# Patient Record
Sex: MALE | Race: WHITE | NOT HISPANIC OR LATINO | Employment: OTHER
[De-identification: names, ages, dates, MRNs, and addresses within clinical notes are randomized per-mention and may not be internally consistent; named-entity substitution may affect disease eponyms.]

---

## 2017-01-30 PROBLEM — L97.522 ULCER OF TOE OF LEFT FOOT, WITH FAT LAYER EXPOSED (HCC): Status: ACTIVE | Noted: 2017-01-30

## 2017-03-13 PROBLEM — B35.1 DERMATOPHYTOSIS, NAIL: Status: ACTIVE | Noted: 2017-03-13

## 2017-06-28 ENCOUNTER — PRIOR ORIGINAL RECORDS (OUTPATIENT)
Dept: OTHER | Age: 72
End: 2017-06-28

## 2017-07-21 ENCOUNTER — PRIOR ORIGINAL RECORDS (OUTPATIENT)
Dept: OTHER | Age: 72
End: 2017-07-21

## 2017-08-09 LAB
ALBUMIN: 3.4 G/DL
ALKALINE PHOSPHATATE(ALK PHOS): 53 IU/L
BILIRUBIN TOTAL: 1.1 MG/DL
BUN: 15 MG/DL
CALCIUM: 9.3 MG/DL
CHLORIDE: 102 MEQ/L
CREATININE, SERUM: 1 MG/DL
GLUCOSE: 113 MG/DL
HEMATOCRIT: 42.8 %
HEMOGLOBIN A1C: 4.7 %
HEMOGLOBIN: 14.7 G/DL
PLATELETS: 142 K/UL
POTASSIUM, SERUM: 4.7 MEQ/L
PROTEIN, TOTAL: 6 G/DL
RED BLOOD COUNT: 4.41 X 10-6/U
SGOT (AST): 30 IU/L
SGPT (ALT): 34 IU/L
SODIUM: 136 MEQ/L
WHITE BLOOD COUNT: 5.3 X 10-3/U

## 2017-08-31 ENCOUNTER — PRIOR ORIGINAL RECORDS (OUTPATIENT)
Dept: OTHER | Age: 72
End: 2017-08-31

## 2017-09-06 ENCOUNTER — PRIOR ORIGINAL RECORDS (OUTPATIENT)
Dept: OTHER | Age: 72
End: 2017-09-06

## 2017-09-19 ENCOUNTER — PRIOR ORIGINAL RECORDS (OUTPATIENT)
Dept: OTHER | Age: 72
End: 2017-09-19

## 2017-09-21 ENCOUNTER — PRIOR ORIGINAL RECORDS (OUTPATIENT)
Dept: OTHER | Age: 72
End: 2017-09-21

## 2017-09-29 ENCOUNTER — PRIOR ORIGINAL RECORDS (OUTPATIENT)
Dept: OTHER | Age: 72
End: 2017-09-29

## 2017-10-17 ENCOUNTER — MYAURORA ACCOUNT LINK (OUTPATIENT)
Dept: OTHER | Age: 72
End: 2017-10-17

## 2017-10-17 ENCOUNTER — HOSPITAL ENCOUNTER (OUTPATIENT)
Dept: CV DIAGNOSTICS | Facility: HOSPITAL | Age: 72
Discharge: HOME | End: 2017-10-17
Attending: INTERNAL MEDICINE

## 2017-10-17 DIAGNOSIS — I48.91 ATRIAL FIBRILLATION, UNSPECIFIED TYPE (HCC): ICD-10-CM

## 2017-10-17 DIAGNOSIS — I25.10 CORONARY ARTERIOSCLEROSIS: ICD-10-CM

## 2018-01-19 ENCOUNTER — PRIOR ORIGINAL RECORDS (OUTPATIENT)
Dept: OTHER | Age: 73
End: 2018-01-19

## 2018-04-03 ENCOUNTER — PRIOR ORIGINAL RECORDS (OUTPATIENT)
Dept: OTHER | Age: 73
End: 2018-04-03

## 2018-04-03 PROCEDURE — 82746 ASSAY OF FOLIC ACID SERUM: CPT | Performed by: INTERNAL MEDICINE

## 2018-04-03 PROCEDURE — 82607 VITAMIN B-12: CPT | Performed by: INTERNAL MEDICINE

## 2018-07-27 ENCOUNTER — MYAURORA ACCOUNT LINK (OUTPATIENT)
Dept: OTHER | Age: 73
End: 2018-07-27

## 2018-07-27 ENCOUNTER — PRIOR ORIGINAL RECORDS (OUTPATIENT)
Dept: OTHER | Age: 73
End: 2018-07-27

## 2018-08-10 LAB
ALBUMIN: 3.7 G/DL
ALKALINE PHOSPHATATE(ALK PHOS): 52 IU/L
BILIRUBIN TOTAL: 0.9 MG/DL
BUN: 19 MG/DL
CALCIUM: 9.1 MG/DL
CHLORIDE: 108 MEQ/L
CREATININE, SERUM: 0.9 MG/DL
GLUCOSE: 102 MG/DL
HEMATOCRIT: 44.8 %
HEMOGLOBIN: 15.1 G/DL
PLATELETS: 149 K/UL
POTASSIUM, SERUM: 4.1 MEQ/L
PROTEIN, TOTAL: 6.4 G/DL
RED BLOOD COUNT: 4.63 X 10-6/U
SGOT (AST): 34 IU/L
SGPT (ALT): 44 IU/L
SODIUM: 142 MEQ/L
THYROID STIMULATING HORMONE: 2.3 MLU/L
WHITE BLOOD COUNT: 6.3 X 10-3/U

## 2018-08-12 PROBLEM — I10 ESSENTIAL HYPERTENSION: Status: ACTIVE | Noted: 2018-08-12

## 2018-08-12 PROBLEM — F41.9 ANXIETY: Status: ACTIVE | Noted: 2018-08-12

## 2018-08-12 PROBLEM — E78.5 HYPERLIPIDEMIA: Status: ACTIVE | Noted: 2018-08-12

## 2018-08-12 PROBLEM — I48.0 PAROXYSMAL ATRIAL FIBRILLATION (HCC): Status: ACTIVE | Noted: 2018-08-12

## 2019-02-12 ENCOUNTER — HOSPITAL ENCOUNTER (OUTPATIENT)
Dept: CV DIAGNOSTICS | Facility: HOSPITAL | Age: 74
Discharge: HOME | End: 2019-02-12
Attending: INTERNAL MEDICINE

## 2019-02-12 DIAGNOSIS — I48.91 ATRIAL FIBRILLATION, UNSPECIFIED TYPE (HCC): ICD-10-CM

## 2019-02-12 DIAGNOSIS — I25.10 CORONARY ARTERIOSCLEROSIS: ICD-10-CM

## 2019-02-18 PROBLEM — I34.0 NON-RHEUMATIC MITRAL REGURGITATION: Status: ACTIVE | Noted: 2019-02-18

## 2019-02-20 ENCOUNTER — PRIOR ORIGINAL RECORDS (OUTPATIENT)
Dept: OTHER | Age: 74
End: 2019-02-20

## 2019-02-21 ENCOUNTER — PRIOR ORIGINAL RECORDS (OUTPATIENT)
Dept: OTHER | Age: 74
End: 2019-02-21

## 2019-02-27 PROBLEM — I38 PULMONARY HYPERTENSION DUE TO LEFT HEART VALVULAR DISEASE (HCC): Status: ACTIVE | Noted: 2019-02-27

## 2019-02-27 PROBLEM — I27.22 PULMONARY HYPERTENSION DUE TO LEFT HEART VALVULAR DISEASE (HCC): Status: ACTIVE | Noted: 2019-02-27

## 2019-02-28 VITALS
HEIGHT: 70 IN | HEART RATE: 64 BPM | SYSTOLIC BLOOD PRESSURE: 132 MMHG | WEIGHT: 193 LBS | BODY MASS INDEX: 27.63 KG/M2 | DIASTOLIC BLOOD PRESSURE: 80 MMHG

## 2019-03-01 RX ORDER — METOPROLOL SUCCINATE 100 MG/1
100 TABLET, EXTENDED RELEASE ORAL DAILY
COMMUNITY
Start: 2018-07-27

## 2019-03-01 RX ORDER — BUSPIRONE HYDROCHLORIDE 15 MG/1
15 TABLET ORAL 2 TIMES DAILY
COMMUNITY
Start: 2017-04-10

## 2019-03-01 RX ORDER — DIAZEPAM 2 MG/1
2 TABLET ORAL DAILY
COMMUNITY
Start: 2018-07-27

## 2019-03-01 RX ORDER — AMITRIPTYLINE HYDROCHLORIDE 10 MG/1
30 TABLET, FILM COATED ORAL DAILY
COMMUNITY
Start: 2018-07-27

## 2019-03-01 RX ORDER — LISINOPRIL 40 MG/1
40 TABLET ORAL DAILY
COMMUNITY
Start: 2018-07-27

## 2019-03-01 RX ORDER — PRASUGREL 10 MG/1
10 TABLET, FILM COATED ORAL EVERY EVENING
COMMUNITY
Start: 2017-07-21

## 2019-03-05 ENCOUNTER — OFFICE VISIT (OUTPATIENT)
Dept: CARDIOLOGY | Age: 74
End: 2019-03-05

## 2019-03-05 VITALS
HEART RATE: 68 BPM | SYSTOLIC BLOOD PRESSURE: 112 MMHG | WEIGHT: 186 LBS | DIASTOLIC BLOOD PRESSURE: 68 MMHG | BODY MASS INDEX: 26.63 KG/M2 | RESPIRATION RATE: 20 BRPM | HEIGHT: 70 IN

## 2019-03-05 DIAGNOSIS — I48.0 PAROXYSMAL ATRIAL FIBRILLATION (CMD): ICD-10-CM

## 2019-03-05 DIAGNOSIS — I27.22 PULMONARY HYPERTENSION DUE TO LEFT HEART DISEASE (CMD): Primary | ICD-10-CM

## 2019-03-05 DIAGNOSIS — I34.0 MODERATE MITRAL REGURGITATION: ICD-10-CM

## 2019-03-05 PROCEDURE — 99205 OFFICE O/P NEW HI 60 MIN: CPT | Performed by: INTERNAL MEDICINE

## 2019-03-05 RX ORDER — FUROSEMIDE 20 MG/1
60 TABLET ORAL EVERY OTHER DAY
COMMUNITY
Start: 2020-03-05

## 2019-03-05 SDOH — HEALTH STABILITY: MENTAL HEALTH: HOW OFTEN DO YOU HAVE A DRINK CONTAINING ALCOHOL?: NEVER

## 2019-03-06 ENCOUNTER — TELEPHONE (OUTPATIENT)
Dept: CARDIOLOGY | Age: 74
End: 2019-03-06

## 2019-03-06 PROBLEM — I27.22 PULMONARY HYPERTENSION DUE TO LEFT HEART DISEASE (CMD): Status: ACTIVE | Noted: 2019-03-06

## 2019-03-06 PROBLEM — I48.0 PAROXYSMAL ATRIAL FIBRILLATION (CMD): Status: ACTIVE | Noted: 2019-03-06

## 2019-03-06 ASSESSMENT — NEW YORK HEART ASSOCIATION (NYHA) CLASSIFICATION: NYHA FUNCTIONAL CLASS: III

## 2019-03-26 ENCOUNTER — HOSPITAL ENCOUNTER (OUTPATIENT)
Dept: GENERAL RADIOLOGY | Facility: HOSPITAL | Age: 74
Discharge: HOME | End: 2019-03-26
Attending: INTERNAL MEDICINE
Payer: MEDICARE

## 2019-03-26 DIAGNOSIS — R13.19 ESOPHAGEAL DYSPHAGIA: ICD-10-CM

## 2019-03-26 PROCEDURE — 74220 X-RAY XM ESOPHAGUS 1CNTRST: CPT | Performed by: INTERNAL MEDICINE

## 2019-04-03 NOTE — PROGRESS NOTES
4/3/2019  Flaquita Gonzalezodell    Dear Maria De Jesus Rico,     Here are the results from your recent testing :    Imaging Orders  Esophagram    Results and Recommendations     As conveyed by Dr. Hong Gibbs, there is a

## 2019-04-11 PROBLEM — I50.32 CHRONIC HEART FAILURE WITH PRESERVED EJECTION FRACTION (HCC): Status: ACTIVE | Noted: 2019-04-11

## 2019-04-30 ENCOUNTER — E-ADVICE (OUTPATIENT)
Dept: CARDIOLOGY | Age: 74
End: 2019-04-30

## 2019-05-08 ENCOUNTER — E-ADVICE (OUTPATIENT)
Dept: CARDIOLOGY | Age: 74
End: 2019-05-08

## 2019-05-28 ENCOUNTER — E-ADVICE (OUTPATIENT)
Dept: CARDIOLOGY | Age: 74
End: 2019-05-28

## 2019-05-30 ENCOUNTER — E-ADVICE (OUTPATIENT)
Dept: CARDIOLOGY | Age: 74
End: 2019-05-30

## 2019-06-11 ENCOUNTER — E-ADVICE (OUTPATIENT)
Dept: CARDIOLOGY | Age: 74
End: 2019-06-11

## 2019-07-09 ENCOUNTER — E-ADVICE (OUTPATIENT)
Dept: CARDIOLOGY | Age: 74
End: 2019-07-09

## 2019-07-10 ENCOUNTER — ANESTHESIA EVENT (OUTPATIENT)
Dept: ENDOSCOPY | Facility: HOSPITAL | Age: 74
End: 2019-07-10

## 2019-07-11 ENCOUNTER — HOSPITAL ENCOUNTER (OUTPATIENT)
Facility: HOSPITAL | Age: 74
Setting detail: HOSPITAL OUTPATIENT SURGERY
Discharge: HOME | End: 2019-07-11
Attending: INTERNAL MEDICINE | Admitting: INTERNAL MEDICINE
Payer: MEDICARE

## 2019-07-11 ENCOUNTER — HOSPITAL ENCOUNTER (OUTPATIENT)
Dept: CV DIAGNOSTICS | Facility: HOSPITAL | Age: 74
Discharge: HOME | End: 2019-07-11
Attending: INTERNAL MEDICINE
Payer: MEDICARE

## 2019-07-11 ENCOUNTER — ANESTHESIA (OUTPATIENT)
Dept: ENDOSCOPY | Facility: HOSPITAL | Age: 74
End: 2019-07-11

## 2019-07-11 VITALS
HEIGHT: 70 IN | OXYGEN SATURATION: 100 % | SYSTOLIC BLOOD PRESSURE: 86 MMHG | BODY MASS INDEX: 28.63 KG/M2 | DIASTOLIC BLOOD PRESSURE: 54 MMHG | WEIGHT: 200 LBS | HEART RATE: 66 BPM | RESPIRATION RATE: 16 BRPM | TEMPERATURE: 98 F

## 2019-07-11 DIAGNOSIS — K22.5 ZENKER'S DIVERTICULUM: ICD-10-CM

## 2019-07-11 DIAGNOSIS — R93.1 ABNORMAL ECHOCARDIOGRAM: ICD-10-CM

## 2019-07-11 DIAGNOSIS — I34.0 NON-RHEUMATIC MITRAL REGURGITATION: ICD-10-CM

## 2019-07-11 PROCEDURE — 93325 DOPPLER ECHO COLOR FLOW MAPG: CPT | Performed by: INTERNAL MEDICINE

## 2019-07-11 PROCEDURE — 93312 ECHO TRANSESOPHAGEAL: CPT

## 2019-07-11 PROCEDURE — 93320 DOPPLER ECHO COMPLETE: CPT | Performed by: INTERNAL MEDICINE

## 2019-07-11 PROCEDURE — 0DJ08ZZ INSPECTION OF UPPER INTESTINAL TRACT, VIA NATURAL OR ARTIFICIAL OPENING ENDOSCOPIC: ICD-10-PCS | Performed by: INTERNAL MEDICINE

## 2019-07-11 RX ORDER — SODIUM CHLORIDE, SODIUM LACTATE, POTASSIUM CHLORIDE, CALCIUM CHLORIDE 600; 310; 30; 20 MG/100ML; MG/100ML; MG/100ML; MG/100ML
INJECTION, SOLUTION INTRAVENOUS CONTINUOUS
Status: DISCONTINUED | OUTPATIENT
Start: 2019-07-11 | End: 2019-07-11

## 2019-07-11 NOTE — PROCEDURES
[facility] [address] [place] [address] Cardiology  Transesophageal Echocardiogram Report    PREOPERATIVE DIAGNOSIS: Mitral regurgitation    POSTOPERATIVE DIAGNOSIS: Mitral regurgitation    PROCEDURE PERFORMED: Transesophageal echocardiogram with Doppler (CPT code 43853) and thickness and systolic function. The visualized portions of the tricuspid valve and pulmonic valve have normal morphology and motion. There is evidence of severe prolapse of the P2 scallop of the mitral valve.  The aortic valve morphology appears trileaflet

## 2019-07-11 NOTE — H&P
HPI:  Cassidy Underwood is a 68year old male. 5/4/1945.   Patient presents with:  Checkup: discuss EGD   Difficulty Swallowing           Thank you for sending your patient to see me for evaluation of possible need for RAFFAELE.     Notes dysphagia x 10 yrs     P MOUTH EVERY DAY AS NEEDED Disp: 30 tablet Rfl: 0   AMITRIPTYLINE HCL 10 MG Oral Tab TAKE 3 TABLETS BY MOUTH NIGHTLY Disp: 90 tablet Rfl: 0   BusPIRone HCl 15 MG Oral Tab TAKE TWO TABLETS BY MOUTH TWICE DAILY Disp: 180 tablet Rfl: 3   ROSUVASTATIN CALCIUM 1 in thirst or heat/cold intolerance  ALL/ASTHMA: denies hx of allergy or asthma        PHYSICAL EXAM:   /52   Ht 5' 9\" (1.753 m)   Wt 183 lb (83 kg)   BMI 27.02 kg/m²    GENERAL: well developed, well nourished, in no apparent distress  SKIN: no rashe image quality.     ADVERSE REACTION: None.     FINDINGS:      CHEST:   Coronary artery calcifications are present. No abnormally enlarged lymph nodes are appreciated in  the mediastinum or stella bilaterally.  There are some small calcified lymph nodes noted Bosniak  2F lesion. A follow-up CT or ultrasound in one year is recommended to document stability. 3. Likely bilateral adrenal gland adenomas.     Procedure Data:     Assessment and Plan   1. Dysphagia  Denies heartburn, regurgitation.    Denies to foods

## 2019-07-11 NOTE — ANESTHESIA PREPROCEDURE EVALUATION
PRE-OP EVALUATION    Patient Name: Mercy Hoang    Pre-op Diagnosis: Abnormal echocardiogram [R93.1]  Non-rheumatic mitral regurgitation [I34.0]  Zenker's diverticulum [K22.5]    Procedure(s):  SCHEDULED FOR TRANSESOPHAGEAL ECHOCARDIOGRAPHY (RAFFAELE) WITH Social History    Tobacco Use      Smoking status: Former Smoker      Smokeless tobacco: Never Used      Tobacco comment: QUIT 1982    Alcohol use: No      Comment: QUIT 1968      Drug use: No     Available pre-op labs reviewed.   Lab Results   Componen

## 2019-07-11 NOTE — PROGRESS NOTES
Per Dr. Denisha Winston the anesthesiologist the patient's BP runs low as normal VS's. He denies any pain or dizziness.  His belongings have been brought back in secure bag by a  and he acknowledges all of his belongings intact with witnessed signatur

## 2019-07-11 NOTE — OPERATIVE REPORT
[facility] OPERATIVE REPORT   PATIENT NAME: Wolf Almonte  MRN: [id number]  DATE OF OPERATION: 7/11/2019  PREOPERATIVE DIAGNOSIS:   Dysphagia  Zenker's diverticulum per esophagram    POSTOPERATIVE DIAGNOSES:    Normal duodenum

## 2019-07-11 NOTE — H&P
The patient was seen and examined. There was no change in the patient's clinical status from the date of the last progress note, to the date of their procedure today at [facility]. Thus, the progress note is up-to-date.     The patient is here for initial [facility] with Dr. Jensen today. PRIMARY CARE MD: Dr. Valentino Hager:  Current Outpatient Medications   Medication Sig Dispense Refill   • busPIRone 15 mg tablet Take 15 mg by mouth 2 (two) times daily. weakness and cane/walker  Neurologic: numbness or tingling of hands and numbness or tingling of feet  Psychiatric: no psychological history  Hematologic/Lymphatic/Immunologic: hx Steven's  Endocrine:no endocrine history    Fifi JOHNSONN RN  I have re volume was markedly increased. 7. Right ventricle: Systolic pressure was moderately increased. RV systolic     pressure (S, est): 62mm Hg. 8. Right atrium: The atrium was mildly dilated. 9. Tricuspid valve: There was mild-moderate regurgitation.     Impr estimated to     be 35mm Hg. Impressions:  This study is compared with previous dated 09/24/2014:  Compared to the previous study, LV size has increased. Atrial dimensions  have increased. PA systolic pressure has increased.     TRANSTHORACIC ECHOCARDIOGRA therapy    Denice Estrada MD  [facility] Fellow    Staff addendum: Patient seen and examined with Dr. Augie Spencer, and I have edited his note above to reflect my observations, amplified below.     Sherry Robertson is a 69 y.o. male with a history

## 2019-07-11 NOTE — DISCHARGE SUMMARY
Outpatient Surgery Brief Discharge Summary         Patient ID:  Flaquita Velazquez  [id number]  69year old  5/4/1945    Discharge Diagnoses:   Mild gastritis  Procedures:   EGD  Discharged Condition: stable    Disposition: home    Patient Instructions:  Violetta Flor

## 2019-07-12 ENCOUNTER — E-ADVICE (OUTPATIENT)
Dept: CARDIOLOGY | Age: 74
End: 2019-07-12

## 2019-07-17 NOTE — ANESTHESIA POSTPROCEDURE EVALUATION
[address] Patient Status:  Hospital Outpatient Surgery   Age/Gender 69year old male MRN [id number]   Location [facility]. Attending No att. providers found   Hosp Day # 0 PCP Erma Lanza MD       Anesthesia Post-op

## 2019-08-02 ENCOUNTER — OFFICE VISIT (OUTPATIENT)
Dept: CARDIOLOGY | Age: 74
End: 2019-08-02

## 2019-08-02 VITALS
HEART RATE: 60 BPM | SYSTOLIC BLOOD PRESSURE: 110 MMHG | HEIGHT: 70 IN | DIASTOLIC BLOOD PRESSURE: 60 MMHG | BODY MASS INDEX: 26.63 KG/M2 | WEIGHT: 186 LBS

## 2019-08-02 DIAGNOSIS — I27.20 PULMONARY HYPERTENSION (CMD): ICD-10-CM

## 2019-08-02 DIAGNOSIS — I34.0 NON-RHEUMATIC MITRAL REGURGITATION: ICD-10-CM

## 2019-08-02 DIAGNOSIS — I48.0 PAF (PAROXYSMAL ATRIAL FIBRILLATION) (CMD): Primary | ICD-10-CM

## 2019-08-02 PROCEDURE — 99215 OFFICE O/P EST HI 40 MIN: CPT | Performed by: INTERNAL MEDICINE

## 2019-08-02 ASSESSMENT — ENCOUNTER SYMPTOMS
BRUISES/BLEEDS EASILY: 0
ALLERGIC/IMMUNOLOGIC COMMENTS: NO NEW FOOD ALLERGIES
WEIGHT LOSS: 0
WEIGHT GAIN: 0
SUSPICIOUS LESIONS: 0
CHILLS: 0
HEMATOCHEZIA: 0
HEMOPTYSIS: 0
FEVER: 0
COUGH: 0

## 2019-09-18 ENCOUNTER — HOSPITAL (OUTPATIENT)
Dept: OTHER | Age: 74
End: 2019-09-18

## 2019-09-18 LAB
ANALYZER ANC (IANC): ABNORMAL
ANION GAP SERPL CALC-SCNC: 8 MMOL/L (ref 10–20)
BUN SERPL-MCNC: 22 MG/DL (ref 6–20)
BUN/CREAT SERPL: 20 (ref 7–25)
CALCIUM SERPL-MCNC: 9.4 MG/DL (ref 8.4–10.2)
CHLORIDE SERPL-SCNC: 108 MMOL/L (ref 98–107)
CO2 SERPL-SCNC: 28 MMOL/L (ref 21–32)
CREAT SERPL-MCNC: 1.12 MG/DL (ref 0.67–1.17)
ERYTHROCYTE [DISTWIDTH] IN BLOOD: 11.9 % (ref 11–15)
GLUCOSE SERPL-MCNC: 99 MG/DL (ref 65–99)
HCT VFR BLD CALC: 42.3 % (ref 39–51)
HGB BLD-MCNC: 14 G/DL (ref 13–17)
MCH RBC QN AUTO: 32.9 PG (ref 26–34)
MCHC RBC AUTO-ENTMCNC: 33.1 G/DL (ref 32–36.5)
MCV RBC AUTO: 99.3 FL (ref 78–100)
NRBC (NRBCRE): 0 /100 WBC
PLATELET # BLD: 143 K/MCL (ref 140–450)
POTASSIUM SERPL-SCNC: 4.5 MMOL/L (ref 3.4–5.1)
RBC # BLD: 4.26 MIL/MCL (ref 4.5–5.9)
SODIUM SERPL-SCNC: 140 MMOL/L (ref 135–145)
WBC # BLD: 7.5 K/MCL (ref 4.2–11)

## 2019-09-26 ENCOUNTER — E-ADVICE (OUTPATIENT)
Dept: CARDIOLOGY | Age: 74
End: 2019-09-26

## 2019-09-27 ENCOUNTER — HOSPITAL (OUTPATIENT)
Dept: OTHER | Age: 74
End: 2019-09-27
Attending: INTERNAL MEDICINE

## 2020-02-25 ENCOUNTER — TELEPHONE (OUTPATIENT)
Dept: CARDIOLOGY | Age: 75
End: 2020-02-25

## 2020-02-27 ENCOUNTER — OFFICE VISIT (OUTPATIENT)
Dept: CARDIOLOGY | Age: 75
End: 2020-02-27
Attending: INTERNAL MEDICINE

## 2020-02-27 ENCOUNTER — HOSPITAL ENCOUNTER (OUTPATIENT)
Dept: GENERAL RADIOLOGY | Age: 75
Discharge: HOME | End: 2020-02-27
Attending: NURSE PRACTITIONER

## 2020-02-27 VITALS
DIASTOLIC BLOOD PRESSURE: 68 MMHG | WEIGHT: 202.16 LBS | BODY MASS INDEX: 29.94 KG/M2 | RESPIRATION RATE: 18 BRPM | SYSTOLIC BLOOD PRESSURE: 114 MMHG | HEIGHT: 69 IN | OXYGEN SATURATION: 95 % | HEART RATE: 63 BPM

## 2020-02-27 DIAGNOSIS — I27.20 PULMONARY HYPERTENSION (CMD): ICD-10-CM

## 2020-02-27 DIAGNOSIS — I48.21 PERMANENT ATRIAL FIBRILLATION (CMD): ICD-10-CM

## 2020-02-27 DIAGNOSIS — I34.0 NONRHEUMATIC MITRAL VALVE REGURGITATION: Primary | ICD-10-CM

## 2020-02-27 DIAGNOSIS — I25.10 CORONARY ARTERY DISEASE INVOLVING NATIVE CORONARY ARTERY OF NATIVE HEART WITHOUT ANGINA PECTORIS: ICD-10-CM

## 2020-02-27 DIAGNOSIS — I50.32 CHRONIC HEART FAILURE WITH PRESERVED EJECTION FRACTION (HFPEF) (CMD): ICD-10-CM

## 2020-02-27 DIAGNOSIS — R09.89 OTH SYMPTOMS AND SIGNS INVOLVING THE CIRC AND RESP SYSTEMS: ICD-10-CM

## 2020-02-27 DIAGNOSIS — I10 ESSENTIAL HYPERTENSION: ICD-10-CM

## 2020-02-27 LAB
ALBUMIN SERPL-MCNC: 3.9 G/DL (ref 3.6–5.1)
ALBUMIN/GLOB SERPL: 1.2 {RATIO} (ref 1–2.4)
ALP SERPL-CCNC: 59 UNITS/L (ref 45–117)
ALT SERPL-CCNC: 48 UNITS/L
ANION GAP SERPL CALC-SCNC: 12 MMOL/L (ref 10–20)
AST SERPL-CCNC: 29 UNITS/L
BASOPHILS # BLD: 0 K/MCL (ref 0–0.3)
BASOPHILS NFR BLD: 0 %
BILIRUB SERPL-MCNC: 0.6 MG/DL (ref 0.2–1)
BUN SERPL-MCNC: 28 MG/DL (ref 6–20)
BUN/CREAT SERPL: 23 (ref 7–25)
CALCIUM SERPL-MCNC: 9.3 MG/DL (ref 8.4–10.2)
CHLORIDE SERPL-SCNC: 110 MMOL/L (ref 98–107)
CO2 SERPL-SCNC: 23 MMOL/L (ref 21–32)
CREAT SERPL-MCNC: 1.24 MG/DL (ref 0.67–1.17)
DIFFERENTIAL METHOD BLD: ABNORMAL
EOSINOPHIL # BLD: 0.1 K/MCL (ref 0.1–0.5)
EOSINOPHIL NFR BLD: 1 %
ERYTHROCYTE [DISTWIDTH] IN BLOOD: 12.1 % (ref 11–15)
GLOBULIN SER-MCNC: 3.3 G/DL (ref 2–4)
GLUCOSE SERPL-MCNC: 101 MG/DL (ref 65–99)
HCT VFR BLD CALC: 40.8 % (ref 39–51)
HGB BLD-MCNC: 13.9 G/DL (ref 13–17)
IMM GRANULOCYTES # BLD AUTO: 0.1 K/MCL (ref 0–0.2)
IMM GRANULOCYTES NFR BLD: 1 %
LYMPHOCYTES # BLD: 1.7 K/MCL (ref 1–4)
LYMPHOCYTES NFR BLD: 19 %
MCH RBC QN AUTO: 32.6 PG (ref 26–34)
MCHC RBC AUTO-ENTMCNC: 34.1 G/DL (ref 32–36.5)
MCV RBC AUTO: 95.6 FL (ref 78–100)
MONOCYTES # BLD: 0.9 K/MCL (ref 0.3–0.9)
MONOCYTES NFR BLD: 10 %
NEUTROPHILS # BLD: 6.4 K/MCL (ref 1.8–7.7)
NEUTROPHILS NFR BLD: 69 %
NRBC BLD MANUAL-RTO: 0 /100 WBC
NT-PROBNP SERPL-MCNC: 731 PG/ML
PLATELET # BLD: 147 K/MCL (ref 140–450)
POTASSIUM SERPL-SCNC: 3.9 MMOL/L (ref 3.4–5.1)
PROT SERPL-MCNC: 7.2 G/DL (ref 6.4–8.2)
RBC # BLD: 4.27 MIL/MCL (ref 4.5–5.9)
SODIUM SERPL-SCNC: 141 MMOL/L (ref 135–145)
WBC # BLD: 9.3 K/MCL (ref 4.2–11)

## 2020-02-27 PROCEDURE — 93005 ELECTROCARDIOGRAM TRACING: CPT

## 2020-02-27 PROCEDURE — 86920 COMPATIBILITY TEST SPIN: CPT

## 2020-02-27 PROCEDURE — 36415 COLL VENOUS BLD VENIPUNCTURE: CPT

## 2020-02-27 PROCEDURE — 83880 ASSAY OF NATRIURETIC PEPTIDE: CPT

## 2020-02-27 PROCEDURE — 86900 BLOOD TYPING SEROLOGIC ABO: CPT

## 2020-02-27 PROCEDURE — 80053 COMPREHEN METABOLIC PANEL: CPT

## 2020-02-27 PROCEDURE — 71046 X-RAY EXAM CHEST 2 VIEWS: CPT

## 2020-02-27 PROCEDURE — 85025 COMPLETE CBC W/AUTO DIFF WBC: CPT

## 2020-02-27 RX ORDER — TESTOSTERONE GEL, 1% 10 MG/G
1 GEL TRANSDERMAL DAILY
Refills: 3 | COMMUNITY
Start: 2020-02-11

## 2020-02-27 RX ORDER — EPLERENONE 25 MG/1
25 TABLET, FILM COATED ORAL DAILY
COMMUNITY
Start: 2020-02-24 | End: 2020-02-27

## 2020-02-27 RX ORDER — MICONAZOLE NITRATE, ZINC OXIDE, WHITE PETROLATUM 2.5; 150; 813.5 MG/G; MG/G; MG/G
1 OINTMENT TOPICAL DAILY
COMMUNITY
Start: 2020-01-22

## 2020-02-27 RX ORDER — ROSUVASTATIN CALCIUM 10 MG/1
10 TABLET, COATED ORAL DAILY
COMMUNITY
Start: 2019-12-16

## 2020-02-29 LAB
ATRIAL RATE (BPM): 89
P AXIS (DEGREES): 61
PR-INTERVAL (MSEC): 140
QRS-INTERVAL (MSEC): 96
QT-INTERVAL (MSEC): 370
QTC: 450
R AXIS (DEGREES): -40
REPORT TEXT: NORMAL
T AXIS (DEGREES): 74
VENTRICULAR RATE EKG/MIN (BPM): 89

## 2020-03-02 SDOH — HEALTH STABILITY: MENTAL HEALTH: HOW OFTEN DO YOU HAVE A DRINK CONTAINING ALCOHOL?: NEVER

## 2020-03-03 ENCOUNTER — ANESTHESIA EVENT (OUTPATIENT)
Dept: CARDIOLOGY | Age: 75
DRG: 267 | End: 2020-03-03

## 2020-03-03 PROBLEM — Z95.818 S/P MITRAL VALVE CLIP IMPLANTATION: Status: ACTIVE | Noted: 2020-03-03

## 2020-03-03 PROBLEM — Z98.890 S/P MITRAL VALVE CLIP IMPLANTATION: Status: ACTIVE | Noted: 2020-03-03

## 2020-03-04 ENCOUNTER — HOSPITAL ENCOUNTER (OUTPATIENT)
Dept: CARDIOLOGY | Age: 75
Discharge: HOME | DRG: 267 | End: 2020-03-04
Attending: INTERNAL MEDICINE

## 2020-03-04 ENCOUNTER — APPOINTMENT (OUTPATIENT)
Dept: CARDIOLOGY | Age: 75
DRG: 267 | End: 2020-03-04
Attending: NURSE PRACTITIONER

## 2020-03-04 ENCOUNTER — ANESTHESIA (OUTPATIENT)
Dept: CARDIOLOGY | Age: 75
DRG: 267 | End: 2020-03-04

## 2020-03-04 ENCOUNTER — HOSPITAL ENCOUNTER (INPATIENT)
Age: 75
LOS: 1 days | Discharge: HOME | DRG: 267 | End: 2020-03-05
Attending: INTERNAL MEDICINE | Admitting: INTERNAL MEDICINE

## 2020-03-04 DIAGNOSIS — I34.0 SEVERE MITRAL REGURGITATION: ICD-10-CM

## 2020-03-04 DIAGNOSIS — I10 ESSENTIAL HYPERTENSION: ICD-10-CM

## 2020-03-04 DIAGNOSIS — I50.32 CHRONIC HEART FAILURE WITH PRESERVED EJECTION FRACTION (CMD): ICD-10-CM

## 2020-03-04 DIAGNOSIS — F95.2 TOURETTE SYNDROME: ICD-10-CM

## 2020-03-04 DIAGNOSIS — Z00.6 EXAMINATION FOR NORMAL COMPARISON OR CONTROL IN CLINICAL RESEARCH: ICD-10-CM

## 2020-03-04 DIAGNOSIS — G47.9 ORGANIC SLEEP DISORDER: ICD-10-CM

## 2020-03-04 DIAGNOSIS — E29.1 HYPOGONADISM MALE: ICD-10-CM

## 2020-03-04 DIAGNOSIS — I34.0 NON-RHEUMATIC MITRAL REGURGITATION: ICD-10-CM

## 2020-03-04 DIAGNOSIS — I25.119 CORONARY ARTERY DISEASE INVOLVING NATIVE CORONARY ARTERY OF NATIVE HEART WITH ANGINA PECTORIS (CMD): Primary | ICD-10-CM

## 2020-03-04 PROBLEM — Z95.818 S/P MITRAL VALVE CLIP IMPLANTATION: Status: ACTIVE | Noted: 2020-03-03

## 2020-03-04 PROBLEM — E78.5 HYPERLIPIDEMIA: Status: ACTIVE | Noted: 2018-08-12

## 2020-03-04 PROBLEM — F41.9 ANXIETY: Status: ACTIVE | Noted: 2018-08-12

## 2020-03-04 PROBLEM — I38 PULMONARY HYPERTENSION DUE TO LEFT HEART VALVULAR DISEASE (CMD): Status: ACTIVE | Noted: 2019-02-27

## 2020-03-04 PROBLEM — B35.1 DERMATOPHYTOSIS, NAIL: Status: ACTIVE | Noted: 2017-03-13

## 2020-03-04 PROBLEM — I27.22 PULMONARY HYPERTENSION DUE TO LEFT HEART VALVULAR DISEASE (CMD): Status: ACTIVE | Noted: 2019-02-27

## 2020-03-04 PROBLEM — Z98.890 S/P MITRAL VALVE CLIP IMPLANTATION: Status: ACTIVE | Noted: 2020-03-03

## 2020-03-04 LAB
ACT BLD: 278 BASELINE/TARGET RANGES ARE SET BY CLINICIANS FOR EACH PATIENT/PROCEDURE
ACT BLD: 362 BASELINE/TARGET RANGES ARE SET BY CLINICIANS FOR EACH PATIENT/PROCEDURE
ACT BLD: 381 BASELINE/TARGET RANGES ARE SET BY CLINICIANS FOR EACH PATIENT/PROCEDURE
ANION GAP SERPL CALC-SCNC: 9 MMOL/L (ref 10–20)
ATRIAL RATE (BPM): 65
BUN SERPL-MCNC: 24 MG/DL (ref 6–20)
BUN/CREAT SERPL: 21 (ref 7–25)
CALCIUM SERPL-MCNC: 8.2 MG/DL (ref 8.4–10.2)
CHLORIDE SERPL-SCNC: 111 MMOL/L (ref 98–107)
CO2 SERPL-SCNC: 24 MMOL/L (ref 21–32)
CREAT SERPL-MCNC: 1.12 MG/DL (ref 0.67–1.17)
ERYTHROCYTE [DISTWIDTH] IN BLOOD: 12.3 % (ref 11–15)
GLUCOSE SERPL-MCNC: 143 MG/DL (ref 65–99)
HCT VFR BLD CALC: 36.5 % (ref 39–51)
HGB BLD-MCNC: 12 G/DL (ref 13–17)
MCH RBC QN AUTO: 31.7 PG (ref 26–34)
MCHC RBC AUTO-ENTMCNC: 32.9 G/DL (ref 32–36.5)
MCV RBC AUTO: 96.3 FL (ref 78–100)
NRBC BLD MANUAL-RTO: 0 /100 WBC
P AXIS (DEGREES): 48
PLATELET # BLD: 133 K/MCL (ref 140–450)
POTASSIUM SERPL-SCNC: 3.9 MMOL/L (ref 3.4–5.1)
PR-INTERVAL (MSEC): 110
QRS-INTERVAL (MSEC): 98
QT-INTERVAL (MSEC): 448
QTC: 466
R AXIS (DEGREES): 16
RBC # BLD: 3.79 MIL/MCL (ref 4.5–5.9)
REPORT TEXT: NORMAL
SODIUM SERPL-SCNC: 140 MMOL/L (ref 135–145)
T AXIS (DEGREES): 32
VENTRICULAR RATE EKG/MIN (BPM): 65
WBC # BLD: 8.5 K/MCL (ref 4.2–11)

## 2020-03-04 PROCEDURE — 10004451 HB PACU RECOVERY 1ST 30 MINUTES: Performed by: INTERNAL MEDICINE

## 2020-03-04 PROCEDURE — 10002800 HB RX 250 W HCPCS: Performed by: ANESTHESIOLOGY

## 2020-03-04 PROCEDURE — 10006023 HB SUPPLY 272: Performed by: INTERNAL MEDICINE

## 2020-03-04 PROCEDURE — 10002800 HB RX 250 W HCPCS: Performed by: NURSE PRACTITIONER

## 2020-03-04 PROCEDURE — C1893 INTRO/SHEATH, FIXED,NON-PEEL: HCPCS | Performed by: INTERNAL MEDICINE

## 2020-03-04 PROCEDURE — C1769 GUIDE WIRE: HCPCS | Performed by: INTERNAL MEDICINE

## 2020-03-04 PROCEDURE — 13000004 HB  ANESTHESIA  GENERAL OUTSIDE OR: Performed by: INTERNAL MEDICINE

## 2020-03-04 PROCEDURE — 93355 ECHO TRANSESOPHAGEAL (TEE): CPT

## 2020-03-04 PROCEDURE — 93005 ELECTROCARDIOGRAM TRACING: CPT | Performed by: NURSE PRACTITIONER

## 2020-03-04 PROCEDURE — 33418 REPAIR TCAT MITRAL VALVE: CPT | Performed by: INTERNAL MEDICINE

## 2020-03-04 PROCEDURE — 10006027 HB SUPPLY 278: Performed by: INTERNAL MEDICINE

## 2020-03-04 PROCEDURE — 10002807 HB RX 258: Performed by: ANESTHESIOLOGY

## 2020-03-04 PROCEDURE — 85347 COAGULATION TIME ACTIVATED: CPT | Performed by: INTERNAL MEDICINE

## 2020-03-04 PROCEDURE — C1760 CLOSURE DEV, VASC: HCPCS | Performed by: INTERNAL MEDICINE

## 2020-03-04 PROCEDURE — 36000 PLACE NEEDLE IN VEIN: CPT | Performed by: ANESTHESIOLOGY

## 2020-03-04 PROCEDURE — 10002019 HB COUNTER RESP ASSESSMENT

## 2020-03-04 PROCEDURE — 10002801 HB RX 250 W/O HCPCS: Performed by: INTERNAL MEDICINE

## 2020-03-04 PROCEDURE — 93308 TTE F-UP OR LMTD: CPT

## 2020-03-04 PROCEDURE — 10004281 HB COUNTER-STAFF TIME PER 15 MIN

## 2020-03-04 PROCEDURE — 36415 COLL VENOUS BLD VENIPUNCTURE: CPT

## 2020-03-04 PROCEDURE — 85027 COMPLETE CBC AUTOMATED: CPT

## 2020-03-04 PROCEDURE — 80048 BASIC METABOLIC PNL TOTAL CA: CPT

## 2020-03-04 PROCEDURE — 10002805 HB CONTRAST AGENT: Performed by: INTERNAL MEDICINE

## 2020-03-04 PROCEDURE — C1894 INTRO/SHEATH, NON-LASER: HCPCS | Performed by: INTERNAL MEDICINE

## 2020-03-04 PROCEDURE — 10004452 HB PACU ADDL 30 MINUTES: Performed by: INTERNAL MEDICINE

## 2020-03-04 PROCEDURE — 10002803 HB RX 637: Performed by: NURSE PRACTITIONER

## 2020-03-04 PROCEDURE — 10003585 HB ROOM CHARGE INTERMEDIATE CARE

## 2020-03-04 PROCEDURE — 33419 REPAIR TCAT MITRAL VALVE: CPT | Performed by: INTERNAL MEDICINE

## 2020-03-04 PROCEDURE — 10002801 HB RX 250 W/O HCPCS: Performed by: ANESTHESIOLOGY

## 2020-03-04 PROCEDURE — 02UG3JZ SUPPLEMENT MITRAL VALVE WITH SYNTHETIC SUBSTITUTE, PERCUTANEOUS APPROACH: ICD-10-PCS | Performed by: INTERNAL MEDICINE

## 2020-03-04 DEVICE — MITRACLIP NTR CLIP DELIVERY SYSTEM
Type: IMPLANTABLE DEVICE | Site: MITRAL VALVE | Status: FUNCTIONAL
Brand: MITRACLIP

## 2020-03-04 DEVICE — MITRACLIP XTR CLIP DELIVERY SYSTEM
Type: IMPLANTABLE DEVICE | Site: MITRAL VALVE | Status: FUNCTIONAL
Brand: MITRACLIP

## 2020-03-04 DEVICE — PERCLOSE PROGLIDE™ SUTURE-MEDIATED CLOSURE SYSTEM
Type: IMPLANTABLE DEVICE | Site: FEMORAL VEIN | Status: FUNCTIONAL
Brand: PERCLOSE PROGLIDE™

## 2020-03-04 RX ORDER — METOPROLOL SUCCINATE 100 MG/1
100 TABLET, EXTENDED RELEASE ORAL DAILY
Status: DISCONTINUED | OUTPATIENT
Start: 2020-03-05 | End: 2020-03-05 | Stop reason: HOSPADM

## 2020-03-04 RX ORDER — THIAMINE HCL 50 MG
50 TABLET ORAL DAILY
COMMUNITY

## 2020-03-04 RX ORDER — ONDANSETRON 2 MG/ML
4 INJECTION INTRAMUSCULAR; INTRAVENOUS
Status: DISCONTINUED | OUTPATIENT
Start: 2020-03-04 | End: 2020-03-04 | Stop reason: HOSPADM

## 2020-03-04 RX ORDER — MULTIVITAMIN,THER AND MINERALS
1 TABLET ORAL DAILY
COMMUNITY

## 2020-03-04 RX ORDER — SODIUM CHLORIDE 9 MG/ML
INJECTION, SOLUTION INTRAVENOUS CONTINUOUS
Status: DISCONTINUED | OUTPATIENT
Start: 2020-03-04 | End: 2020-03-05 | Stop reason: HOSPADM

## 2020-03-04 RX ORDER — PROPOFOL 10 MG/ML
INJECTION, EMULSION INTRAVENOUS PRN
Status: DISCONTINUED | OUTPATIENT
Start: 2020-03-04 | End: 2020-03-04

## 2020-03-04 RX ORDER — AMITRIPTYLINE HYDROCHLORIDE 10 MG/1
30 TABLET, FILM COATED ORAL DAILY
Status: DISCONTINUED | OUTPATIENT
Start: 2020-03-04 | End: 2020-03-05 | Stop reason: HOSPADM

## 2020-03-04 RX ORDER — NEOSTIGMINE METHYLSULFATE 1 MG/ML
INJECTION, SOLUTION INTRAVENOUS PRN
Status: DISCONTINUED | OUTPATIENT
Start: 2020-03-04 | End: 2020-03-04

## 2020-03-04 RX ORDER — ASCORBIC ACID 500 MG
500 TABLET ORAL DAILY
COMMUNITY

## 2020-03-04 RX ORDER — SODIUM CHLORIDE 9 MG/ML
INJECTION, SOLUTION INTRAVENOUS
Status: DISPENSED
Start: 2020-03-04 | End: 2020-03-04

## 2020-03-04 RX ORDER — SODIUM CHLORIDE 9 MG/ML
INJECTION, SOLUTION INTRAVENOUS CONTINUOUS PRN
Status: DISCONTINUED | OUTPATIENT
Start: 2020-03-04 | End: 2020-03-05 | Stop reason: HOSPADM

## 2020-03-04 RX ORDER — GLYCOPYRROLATE 0.2 MG/ML
INJECTION, SOLUTION INTRAMUSCULAR; INTRAVENOUS PRN
Status: DISCONTINUED | OUTPATIENT
Start: 2020-03-04 | End: 2020-03-04

## 2020-03-04 RX ORDER — ATORVASTATIN CALCIUM 40 MG/1
40 TABLET, FILM COATED ORAL DAILY
Status: DISCONTINUED | OUTPATIENT
Start: 2020-03-04 | End: 2020-03-05 | Stop reason: HOSPADM

## 2020-03-04 RX ORDER — AMOXICILLIN 250 MG
2 CAPSULE ORAL DAILY
Status: DISCONTINUED | OUTPATIENT
Start: 2020-03-05 | End: 2020-03-05 | Stop reason: HOSPADM

## 2020-03-04 RX ORDER — DEXTROSE MONOHYDRATE, SODIUM CHLORIDE, AND POTASSIUM CHLORIDE 50; 2.98; 4.5 G/1000ML; G/1000ML; G/1000ML
INJECTION, SOLUTION INTRAVENOUS CONTINUOUS PRN
Status: DISCONTINUED | OUTPATIENT
Start: 2020-03-04 | End: 2020-03-05 | Stop reason: HOSPADM

## 2020-03-04 RX ORDER — HYDRALAZINE HYDROCHLORIDE 20 MG/ML
5 INJECTION INTRAMUSCULAR; INTRAVENOUS EVERY 10 MIN PRN
Status: DISCONTINUED | OUTPATIENT
Start: 2020-03-04 | End: 2020-03-04 | Stop reason: HOSPADM

## 2020-03-04 RX ORDER — DIAZEPAM 2 MG/1
2 TABLET ORAL DAILY
Status: DISCONTINUED | OUTPATIENT
Start: 2020-03-04 | End: 2020-03-05 | Stop reason: HOSPADM

## 2020-03-04 RX ORDER — SODIUM CHLORIDE 9 MG/ML
INJECTION, SOLUTION INTRAVENOUS CONTINUOUS
Status: DISCONTINUED | OUTPATIENT
Start: 2020-03-04 | End: 2020-03-04 | Stop reason: HOSPADM

## 2020-03-04 RX ORDER — LISINOPRIL 20 MG/1
40 TABLET ORAL DAILY
Status: DISCONTINUED | OUTPATIENT
Start: 2020-03-04 | End: 2020-03-05 | Stop reason: HOSPADM

## 2020-03-04 RX ORDER — 0.9 % SODIUM CHLORIDE 0.9 %
2 VIAL (ML) INJECTION EVERY 12 HOURS SCHEDULED
Status: DISCONTINUED | OUTPATIENT
Start: 2020-03-04 | End: 2020-03-04 | Stop reason: HOSPADM

## 2020-03-04 RX ORDER — ONDANSETRON 2 MG/ML
INJECTION INTRAMUSCULAR; INTRAVENOUS PRN
Status: DISCONTINUED | OUTPATIENT
Start: 2020-03-04 | End: 2020-03-04

## 2020-03-04 RX ORDER — ACETAMINOPHEN 500 MG
500 TABLET ORAL DAILY
COMMUNITY

## 2020-03-04 RX ORDER — ENEMA 19; 7 G/133ML; G/133ML
1 ENEMA RECTAL ONCE
Status: DISCONTINUED | OUTPATIENT
Start: 2020-03-07 | End: 2020-03-05 | Stop reason: HOSPADM

## 2020-03-04 RX ORDER — SODIUM CHLORIDE, SODIUM GLUCONATE, SODIUM ACETATE, POTASSIUM CHLORIDE AND MAGNESIUM CHLORIDE 526; 502; 368; 37; 30 MG/100ML; MG/100ML; MG/100ML; MG/100ML; MG/100ML
INJECTION, SOLUTION INTRAVENOUS CONTINUOUS PRN
Status: DISCONTINUED | OUTPATIENT
Start: 2020-03-04 | End: 2020-03-04

## 2020-03-04 RX ORDER — VITAMIN B COMPLEX
2000 TABLET ORAL DAILY
COMMUNITY

## 2020-03-04 RX ORDER — TESTOSTERONE GEL, 1% 10 MG/G
10 GEL TRANSDERMAL DAILY
Status: DISCONTINUED | OUTPATIENT
Start: 2020-03-05 | End: 2020-03-05 | Stop reason: HOSPADM

## 2020-03-04 RX ORDER — HEPARIN SODIUM 1000 [USP'U]/ML
INJECTION, SOLUTION INTRAVENOUS; SUBCUTANEOUS PRN
Status: DISCONTINUED | OUTPATIENT
Start: 2020-03-04 | End: 2020-03-04

## 2020-03-04 RX ORDER — POLYETHYLENE GLYCOL 3350 17 G/17G
17 POWDER, FOR SOLUTION ORAL 2 TIMES DAILY
Status: DISCONTINUED | OUTPATIENT
Start: 2020-03-08 | End: 2020-03-05 | Stop reason: HOSPADM

## 2020-03-04 RX ORDER — DEXTROSE AND SODIUM CHLORIDE 5; .45 G/100ML; G/100ML
INJECTION, SOLUTION INTRAVENOUS CONTINUOUS PRN
Status: DISCONTINUED | OUTPATIENT
Start: 2020-03-04 | End: 2020-03-05 | Stop reason: HOSPADM

## 2020-03-04 RX ORDER — DEXAMETHASONE SODIUM PHOSPHATE 4 MG/ML
INJECTION, SOLUTION INTRA-ARTICULAR; INTRALESIONAL; INTRAMUSCULAR; INTRAVENOUS; SOFT TISSUE PRN
Status: DISCONTINUED | OUTPATIENT
Start: 2020-03-04 | End: 2020-03-04

## 2020-03-04 RX ORDER — ATORVASTATIN CALCIUM 40 MG/1
40 TABLET, FILM COATED ORAL NIGHTLY
Status: DISCONTINUED | OUTPATIENT
Start: 2020-03-04 | End: 2020-03-04

## 2020-03-04 RX ORDER — BISACODYL 10 MG
10 SUPPOSITORY, RECTAL RECTAL ONCE
Status: DISCONTINUED | OUTPATIENT
Start: 2020-03-06 | End: 2020-03-05 | Stop reason: HOSPADM

## 2020-03-04 RX ORDER — EPHEDRINE SULFATE/0.9% NACL/PF 50 MG/10ML
SYRINGE (ML) INTRAVENOUS PRN
Status: DISCONTINUED | OUTPATIENT
Start: 2020-03-04 | End: 2020-03-04

## 2020-03-04 RX ORDER — BUSPIRONE HYDROCHLORIDE 15 MG/1
15 TABLET ORAL 2 TIMES DAILY
Status: DISCONTINUED | OUTPATIENT
Start: 2020-03-04 | End: 2020-03-05 | Stop reason: HOSPADM

## 2020-03-04 RX ORDER — IODIXANOL 320 MG/ML
INJECTION, SOLUTION INTRAVASCULAR PRN
Status: DISCONTINUED | OUTPATIENT
Start: 2020-03-04 | End: 2020-03-04 | Stop reason: HOSPADM

## 2020-03-04 RX ORDER — TESTOSTERONE GEL, 1% 10 MG/G
5 GEL TRANSDERMAL DAILY
Status: DISCONTINUED | OUTPATIENT
Start: 2020-03-04 | End: 2020-03-04 | Stop reason: DRUGHIGH

## 2020-03-04 RX ORDER — DIAZEPAM 5 MG/1
5 TABLET ORAL ONCE
Status: COMPLETED | OUTPATIENT
Start: 2020-03-04 | End: 2020-03-04

## 2020-03-04 RX ORDER — PRASUGREL 10 MG/1
10 TABLET, FILM COATED ORAL DAILY
Status: DISCONTINUED | OUTPATIENT
Start: 2020-03-04 | End: 2020-03-05 | Stop reason: HOSPADM

## 2020-03-04 RX ORDER — LIDOCAINE HYDROCHLORIDE 20 MG/ML
INJECTION, SOLUTION EPIDURAL; INFILTRATION; INTRACAUDAL; PERINEURAL PRN
Status: DISCONTINUED | OUTPATIENT
Start: 2020-03-04 | End: 2020-03-04 | Stop reason: HOSPADM

## 2020-03-04 RX ORDER — FUROSEMIDE 80 MG
80 TABLET ORAL EVERY OTHER DAY
Status: DISCONTINUED | OUTPATIENT
Start: 2020-03-04 | End: 2020-03-04 | Stop reason: DRUGHIGH

## 2020-03-04 RX ORDER — ROCURONIUM BROMIDE 10 MG/ML
INJECTION, SOLUTION INTRAVENOUS PRN
Status: DISCONTINUED | OUTPATIENT
Start: 2020-03-04 | End: 2020-03-04

## 2020-03-04 RX ORDER — ALBUTEROL SULFATE 2.5 MG/3ML
2.5 SOLUTION RESPIRATORY (INHALATION)
Status: DISCONTINUED | OUTPATIENT
Start: 2020-03-04 | End: 2020-03-05 | Stop reason: HOSPADM

## 2020-03-04 RX ORDER — LANOLIN ALCOHOL/MO/W.PET/CERES
20 CREAM (GRAM) TOPICAL DAILY
COMMUNITY

## 2020-03-04 RX ADMIN — HEPARIN SODIUM 2000 UNITS: 1000 INJECTION, SOLUTION INTRAVENOUS; SUBCUTANEOUS at 08:55

## 2020-03-04 RX ADMIN — Medication 5 MG: at 09:35

## 2020-03-04 RX ADMIN — FENTANYL CITRATE 50 MCG: 50 INJECTION INTRAMUSCULAR; INTRAVENOUS at 10:12

## 2020-03-04 RX ADMIN — FENTANYL CITRATE 100 MCG: 50 INJECTION INTRAMUSCULAR; INTRAVENOUS at 07:59

## 2020-03-04 RX ADMIN — LISINOPRIL 40 MG: 20 TABLET ORAL at 18:04

## 2020-03-04 RX ADMIN — FENTANYL CITRATE 25 MCG: 50 INJECTION, SOLUTION INTRAMUSCULAR; INTRAVENOUS at 11:39

## 2020-03-04 RX ADMIN — HEPARIN SODIUM 9000 UNITS: 1000 INJECTION, SOLUTION INTRAVENOUS; SUBCUTANEOUS at 08:44

## 2020-03-04 RX ADMIN — DEXAMETHASONE SODIUM PHOSPHATE 4 MG: 4 INJECTION, SOLUTION INTRAMUSCULAR; INTRAVENOUS at 08:10

## 2020-03-04 RX ADMIN — GLYCOPYRROLATE 0.5 MG: 0.2 INJECTION, SOLUTION INTRAMUSCULAR; INTRAVENOUS at 10:08

## 2020-03-04 RX ADMIN — METOPROLOL TARTRATE 25 MG: 25 TABLET ORAL at 19:39

## 2020-03-04 RX ADMIN — SODIUM CHLORIDE, SODIUM GLUCONATE, SODIUM ACETATE, POTASSIUM CHLORIDE AND MAGNESIUM CHLORIDE: 526; 502; 368; 37; 30 INJECTION, SOLUTION INTRAVENOUS at 07:48

## 2020-03-04 RX ADMIN — ROCURONIUM BROMIDE 50 MG: 10 INJECTION INTRAVENOUS at 08:00

## 2020-03-04 RX ADMIN — ROCURONIUM BROMIDE 20 MG: 10 INJECTION INTRAVENOUS at 09:45

## 2020-03-04 RX ADMIN — CEFAZOLIN SODIUM 2000 MG: 300 INJECTION, POWDER, LYOPHILIZED, FOR SOLUTION INTRAVENOUS at 21:21

## 2020-03-04 RX ADMIN — Medication 5 MG: at 08:28

## 2020-03-04 RX ADMIN — DIAZEPAM 5 MG: 5 TABLET ORAL at 06:17

## 2020-03-04 RX ADMIN — CEFAZOLIN SODIUM 2000 MG: 300 INJECTION, POWDER, LYOPHILIZED, FOR SOLUTION INTRAVENOUS at 15:17

## 2020-03-04 RX ADMIN — Medication 5 MG: at 08:51

## 2020-03-04 RX ADMIN — PRASUGREL 10 MG: 10 TABLET, FILM COATED ORAL at 18:05

## 2020-03-04 RX ADMIN — CEFAZOLIN SODIUM 2000 MG: 300 INJECTION, POWDER, LYOPHILIZED, FOR SOLUTION INTRAVENOUS at 08:11

## 2020-03-04 RX ADMIN — FENTANYL CITRATE 50 MCG: 50 INJECTION INTRAMUSCULAR; INTRAVENOUS at 09:58

## 2020-03-04 RX ADMIN — ROCURONIUM BROMIDE 10 MG: 10 INJECTION INTRAVENOUS at 09:05

## 2020-03-04 RX ADMIN — PHENYLEPHRINE HYDROCHLORIDE 5 MCG/MIN: 10 INJECTION, SOLUTION INTRAMUSCULAR; INTRAVENOUS; SUBCUTANEOUS at 08:08

## 2020-03-04 RX ADMIN — Medication 5 MG: at 10:03

## 2020-03-04 RX ADMIN — ONDANSETRON 4 MG: 2 INJECTION INTRAMUSCULAR; INTRAVENOUS at 10:08

## 2020-03-04 RX ADMIN — BUSPIRONE HYDROCHLORIDE 15 MG: 15 TABLET ORAL at 18:05

## 2020-03-04 RX ADMIN — NEOSTIGMINE METHYLSULFATE 4 MG: 1 INJECTION INTRAVENOUS at 10:08

## 2020-03-04 RX ADMIN — PROPOFOL 150 MG: 10 INJECTION, EMULSION INTRAVENOUS at 08:00

## 2020-03-04 RX ADMIN — FENTANYL CITRATE 25 MCG: 50 INJECTION, SOLUTION INTRAMUSCULAR; INTRAVENOUS at 11:08

## 2020-03-04 ASSESSMENT — ENCOUNTER SYMPTOMS
COUGH: 0
CHEST TIGHTNESS: 0
HEADACHES: 0
FATIGUE: 0
ABDOMINAL DISTENTION: 0
CHILLS: 0
ABDOMINAL PAIN: 0

## 2020-03-04 ASSESSMENT — PAIN SCALES - GENERAL
PAINLEVEL_OUTOF10: 0
PAINLEVEL_OUTOF10: 4
PAINLEVEL_OUTOF10: 0

## 2020-03-04 ASSESSMENT — PULMONARY FUNCTION TESTS
FEV1_PREDICTED: 0
FEV1: 0
FEV1/FVC: UNABLE TO OBTAIN, OR GREATER THAN 70%
FEV1_PREDICTED: 0
FEV1: 0
FEV1: 0

## 2020-03-05 VITALS
HEIGHT: 69 IN | BODY MASS INDEX: 30.04 KG/M2 | RESPIRATION RATE: 16 BRPM | WEIGHT: 202.82 LBS | OXYGEN SATURATION: 99 % | TEMPERATURE: 96.8 F | HEART RATE: 74 BPM | DIASTOLIC BLOOD PRESSURE: 74 MMHG | SYSTOLIC BLOOD PRESSURE: 113 MMHG

## 2020-03-05 LAB
ANION GAP SERPL CALC-SCNC: 8 MMOL/L (ref 10–20)
ATRIAL RATE (BPM): 71
BUN SERPL-MCNC: 20 MG/DL (ref 6–20)
BUN/CREAT SERPL: 21 (ref 7–25)
CALCIUM SERPL-MCNC: 8.7 MG/DL (ref 8.4–10.2)
CHLORIDE SERPL-SCNC: 113 MMOL/L (ref 98–107)
CO2 SERPL-SCNC: 25 MMOL/L (ref 21–32)
CREAT SERPL-MCNC: 0.96 MG/DL (ref 0.67–1.17)
ERYTHROCYTE [DISTWIDTH] IN BLOOD: 12.6 % (ref 11–15)
GLUCOSE SERPL-MCNC: 194 MG/DL (ref 65–99)
HCT VFR BLD CALC: 36.4 % (ref 39–51)
HGB BLD-MCNC: 11.7 G/DL (ref 13–17)
MCH RBC QN AUTO: 31.7 PG (ref 26–34)
MCHC RBC AUTO-ENTMCNC: 32.1 G/DL (ref 32–36.5)
MCV RBC AUTO: 98.6 FL (ref 78–100)
NRBC BLD MANUAL-RTO: 0 /100 WBC
P AXIS (DEGREES): 26
PLATELET # BLD: 146 K/MCL (ref 140–450)
POTASSIUM SERPL-SCNC: 4.2 MMOL/L (ref 3.4–5.1)
PR-INTERVAL (MSEC): 114
QRS-INTERVAL (MSEC): 96
QT-INTERVAL (MSEC): 406
QTC: 441
R AXIS (DEGREES): -30
RBC # BLD: 3.69 MIL/MCL (ref 4.5–5.9)
REPORT TEXT: NORMAL
SODIUM SERPL-SCNC: 142 MMOL/L (ref 135–145)
T AXIS (DEGREES): 5
VENTRICULAR RATE EKG/MIN (BPM): 71
WBC # BLD: 10.7 K/MCL (ref 4.2–11)

## 2020-03-05 PROCEDURE — 10002803 HB RX 637: Performed by: NURSE PRACTITIONER

## 2020-03-05 PROCEDURE — 36415 COLL VENOUS BLD VENIPUNCTURE: CPT

## 2020-03-05 PROCEDURE — 93005 ELECTROCARDIOGRAM TRACING: CPT | Performed by: NURSE PRACTITIONER

## 2020-03-05 PROCEDURE — 85027 COMPLETE CBC AUTOMATED: CPT

## 2020-03-05 PROCEDURE — 80048 BASIC METABOLIC PNL TOTAL CA: CPT

## 2020-03-05 RX ADMIN — AMITRIPTYLINE HYDROCHLORIDE 30 MG: 10 TABLET, FILM COATED ORAL at 07:04

## 2020-03-05 RX ADMIN — METOPROLOL SUCCINATE 100 MG: 100 TABLET, EXTENDED RELEASE ORAL at 07:07

## 2020-03-05 RX ADMIN — DIAZEPAM 2 MG: 2 TABLET ORAL at 07:07

## 2020-03-05 RX ADMIN — SENNOSIDES AND DOCUSATE SODIUM 2 TABLET: 8.6; 5 TABLET ORAL at 08:48

## 2020-03-05 RX ADMIN — BUSPIRONE HYDROCHLORIDE 15 MG: 15 TABLET ORAL at 07:07

## 2020-03-05 RX ADMIN — TESTOSTERONE 10 G: 10 GEL TOPICAL at 07:32

## 2020-03-05 ASSESSMENT — PAIN SCALES - GENERAL: PAINLEVEL_OUTOF10: 0

## 2020-03-08 LAB
ABO + RH BLD: NORMAL
BLD GP AB SCN SERPL QL GEL: NEGATIVE
BLD PROD TYP BPU: NORMAL
BLD UNIT ID BPU: NORMAL
CROSSMATCH EXPIRE: NORMAL
MAJ XM SERPL-IMP: NORMAL
NUM BPU REQUESTED: 2
STATUS OF UNIT: NORMAL
TRANSFUSION STATUS: NORMAL
UNIT DIVISION: 0

## 2020-03-15 PROBLEM — L97.522 ULCER OF TOE OF LEFT FOOT, WITH FAT LAYER EXPOSED (HCC): Status: RESOLVED | Noted: 2017-01-30 | Resolved: 2020-03-15

## 2020-03-15 PROBLEM — I10 ESSENTIAL HYPERTENSION: Status: RESOLVED | Noted: 2018-08-12 | Resolved: 2020-03-15

## 2020-03-15 PROBLEM — E78.5 HYPERLIPIDEMIA: Status: RESOLVED | Noted: 2018-08-12 | Resolved: 2020-03-15

## 2020-03-15 PROBLEM — I48.0 PAROXYSMAL ATRIAL FIBRILLATION (HCC): Status: RESOLVED | Noted: 2018-08-12 | Resolved: 2020-03-15

## 2020-03-15 PROBLEM — B35.1 DERMATOPHYTOSIS, NAIL: Status: RESOLVED | Noted: 2017-03-13 | Resolved: 2020-03-15

## 2020-11-03 PROBLEM — Z95.820 S/P ANGIOPLASTY WITH STENT: Status: ACTIVE | Noted: 2020-11-03

## 2021-10-04 ENCOUNTER — TELEPHONE (OUTPATIENT)
Dept: ORTHOPEDICS CLINIC | Facility: CLINIC | Age: 76
End: 2021-10-04

## 2021-10-04 NOTE — TELEPHONE ENCOUNTER
NP Patient called requesting to speak with a nurse in regards to his Left elbow. Patient states that it is urgent, he has Swelling on the elbow, hot to the touch, and red. Pain is 4/10. No recent imaging done,   Would like a call back.    Future Appointment

## 2021-10-04 NOTE — TELEPHONE ENCOUNTER
Verified voicemail reached. Attempted to call Shari Henriquez regarding request for call back to office to discuss elbow concerns. Reached voicemail, left voicemail and provided a detail message with my call back contact information.

## 2021-10-05 NOTE — TELEPHONE ENCOUNTER
Contacted patient who states he was looking for the doctors information as he would like to look up his credentials. States he does not want a sooner appt as he needs an early appt (7am). Patient states he is okay to wait until his appt.  Does not have ques

## 2021-10-15 ENCOUNTER — HOSPITAL ENCOUNTER (OUTPATIENT)
Dept: GENERAL RADIOLOGY | Age: 76
Discharge: HOME | End: 2021-10-15
Attending: ORTHOPAEDIC SURGERY
Payer: MEDICARE

## 2021-10-15 ENCOUNTER — OFFICE VISIT (OUTPATIENT)
Dept: ORTHOPEDICS CLINIC | Facility: CLINIC | Age: 76
End: 2021-10-15
Payer: MEDICARE

## 2021-10-15 VITALS — OXYGEN SATURATION: 96 % | HEART RATE: 53 BPM

## 2021-10-15 DIAGNOSIS — M25.521 RIGHT ELBOW PAIN: ICD-10-CM

## 2021-10-15 DIAGNOSIS — M79.645 FINGER PAIN, LEFT: Primary | ICD-10-CM

## 2021-10-15 DIAGNOSIS — M79.645 FINGER PAIN, LEFT: ICD-10-CM

## 2021-10-15 PROCEDURE — 99204 OFFICE O/P NEW MOD 45 MIN: CPT | Performed by: ORTHOPAEDIC SURGERY

## 2021-10-15 PROCEDURE — 73030 X-RAY EXAM OF SHOULDER: CPT | Performed by: ORTHOPAEDIC SURGERY

## 2021-10-15 PROCEDURE — 73140 X-RAY EXAM OF FINGER(S): CPT | Performed by: ORTHOPAEDIC SURGERY

## 2021-10-15 PROCEDURE — 73080 X-RAY EXAM OF ELBOW: CPT | Performed by: ORTHOPAEDIC SURGERY

## 2021-12-26 NOTE — H&P
Clinic Note [facility]     Assessment/Plan:  77year old male    1. Left elbow olecranon bursitis–would recommend observation and the use of compression either an elbow sleeve or an Ace wrap to facilitate reabsorption of the fluid.   2. Left index fi bilateral symmetric ext/flex  Wrist: normal bilateral symmetric ext/flex/sup/pro  Finger: full composite fist   Special Tests:  Sensation is intact light touch in the median, ulnar, radial sensory nerve distribution.   Motor functions intact AIN, PIN, ulnar Gel Apply 2 packets daily 180 each 3   • Zaleplon 5 MG Oral Cap Take 1 capsule (5 mg total) by mouth nightly as needed.  12 capsule 0   • DIAZEPAM 2 MG Oral Tab TAKE 1 TABLET BY MOUTH EVERY 6 HOURS AS NEEDED ANXIETY 60 tablet 0   • Prasugrel HCl 10 MG Oral Review of Systems (negative unless bolded):  General: fevers, chills, fatigue  CV:  chest pain, palpitations, leg swelling  Msk: bodyaches, neck pain, neck stiffness  Skin: rashes, open wounds, nonhealing ulcers  Hem: bleeds easily, bruise easily, immu

## 2022-01-01 ENCOUNTER — APPOINTMENT (OUTPATIENT)
Dept: CT IMAGING | Facility: HOSPITAL | Age: 77
End: 2022-01-01
Attending: EMERGENCY MEDICINE
Payer: MEDICARE

## 2022-01-01 ENCOUNTER — APPOINTMENT (OUTPATIENT)
Dept: GENERAL RADIOLOGY | Facility: HOSPITAL | Age: 77
End: 2022-01-01
Attending: EMERGENCY MEDICINE
Payer: MEDICARE

## 2022-01-01 ENCOUNTER — APPOINTMENT (OUTPATIENT)
Dept: MRI IMAGING | Facility: HOSPITAL | Age: 77
End: 2022-01-01
Attending: EMERGENCY MEDICINE
Payer: MEDICARE

## 2022-01-01 ENCOUNTER — APPOINTMENT (OUTPATIENT)
Dept: GENERAL RADIOLOGY | Facility: HOSPITAL | Age: 77
End: 2022-01-01
Attending: NURSE PRACTITIONER
Payer: MEDICARE

## 2022-01-01 ENCOUNTER — APPOINTMENT (OUTPATIENT)
Dept: CV DIAGNOSTICS | Facility: HOSPITAL | Age: 77
End: 2022-01-01
Attending: EMERGENCY MEDICINE
Payer: MEDICARE

## 2022-01-01 ENCOUNTER — APPOINTMENT (OUTPATIENT)
Dept: GENERAL RADIOLOGY | Facility: HOSPITAL | Age: 77
End: 2022-01-01
Attending: INTERNAL MEDICINE
Payer: MEDICARE

## 2022-01-01 ENCOUNTER — APPOINTMENT (OUTPATIENT)
Dept: INTERVENTIONAL RADIOLOGY/VASCULAR | Facility: HOSPITAL | Age: 77
End: 2022-01-01
Attending: PHYSICIAN ASSISTANT
Payer: MEDICARE

## 2022-01-01 ENCOUNTER — APPOINTMENT (OUTPATIENT)
Dept: INTERVENTIONAL RADIOLOGY/VASCULAR | Facility: HOSPITAL | Age: 77
End: 2022-01-01
Attending: EMERGENCY MEDICINE
Payer: MEDICARE

## 2022-01-01 ENCOUNTER — APPOINTMENT (OUTPATIENT)
Dept: ULTRASOUND IMAGING | Facility: HOSPITAL | Age: 77
End: 2022-01-01
Attending: EMERGENCY MEDICINE
Payer: MEDICARE

## 2022-01-01 ENCOUNTER — HOSPITAL ENCOUNTER (INPATIENT)
Facility: HOSPITAL | Age: 77
LOS: 2 days | End: 2022-01-01
Attending: EMERGENCY MEDICINE | Admitting: INTERNAL MEDICINE
Payer: MEDICARE

## 2022-01-01 VITALS
HEIGHT: 69 IN | RESPIRATION RATE: 24 BRPM | TEMPERATURE: 95 F | WEIGHT: 202.81 LBS | SYSTOLIC BLOOD PRESSURE: 80 MMHG | BODY MASS INDEX: 30.04 KG/M2 | OXYGEN SATURATION: 97 % | DIASTOLIC BLOOD PRESSURE: 36 MMHG

## 2022-01-01 DIAGNOSIS — I50.9 ACUTE ON CHRONIC CONGESTIVE HEART FAILURE, UNSPECIFIED HEART FAILURE TYPE (HCC): ICD-10-CM

## 2022-01-01 DIAGNOSIS — E87.3 RESPIRATORY ALKALOSIS: ICD-10-CM

## 2022-01-01 DIAGNOSIS — I95.9 HYPOTENSION, UNSPECIFIED HYPOTENSION TYPE: Primary | ICD-10-CM

## 2022-01-01 DIAGNOSIS — R79.89 ELEVATED D-DIMER: ICD-10-CM

## 2022-01-01 DIAGNOSIS — N28.9 RENAL INSUFFICIENCY: ICD-10-CM

## 2022-01-01 DIAGNOSIS — I48.91 ATRIAL FIBRILLATION WITH RAPID VENTRICULAR RESPONSE (HCC): ICD-10-CM

## 2022-01-01 DIAGNOSIS — E87.2 METABOLIC ACIDOSIS: ICD-10-CM

## 2022-01-01 DIAGNOSIS — R79.89 ELEVATED LFTS: ICD-10-CM

## 2022-01-01 DIAGNOSIS — R33.9 URINARY RETENTION: ICD-10-CM

## 2022-01-01 LAB
ADENOVIRUS PCR:: NOT DETECTED
ALBUMIN SERPL-MCNC: 1.7 G/DL (ref 3.4–5)
ALBUMIN SERPL-MCNC: 3.4 G/DL (ref 3.4–5)
ALBUMIN SERPL-MCNC: 3.4 G/DL (ref 3.4–5)
ALBUMIN/GLOB SERPL: 1.1 {RATIO} (ref 1–2)
ALBUMIN/GLOB SERPL: 1.1 {RATIO} (ref 1–2)
ALBUMIN/GLOB SERPL: 1.2 {RATIO} (ref 1–2)
ALP LIVER SERPL-CCNC: 135 U/L
ALP LIVER SERPL-CCNC: 75 U/L
ALP LIVER SERPL-CCNC: 77 U/L
ALT SERPL-CCNC: 1287 U/L
ALT SERPL-CCNC: 2190 U/L
ALT SERPL-CCNC: 7071 U/L
AMMONIA PLAS-MCNC: 58 UMOL/L (ref 11–32)
ANION GAP SERPL CALC-SCNC: 14 MMOL/L (ref 0–18)
ANION GAP SERPL CALC-SCNC: 16 MMOL/L (ref 0–18)
ANION GAP SERPL CALC-SCNC: 18 MMOL/L (ref 0–18)
ANION GAP SERPL CALC-SCNC: 27 MMOL/L (ref 0–18)
ANION GAP SERPL CALC-SCNC: 32 MMOL/L (ref 0–18)
ANTIBODY SCREEN: NEGATIVE
APTT PPP: 32.1 SECONDS (ref 23.3–35.6)
APTT PPP: 34.5 SECONDS (ref 23.3–35.6)
APTT PPP: 51.8 SECONDS (ref 23.3–35.6)
ARTERIAL PATENCY WRIST A: POSITIVE
AST SERPL-CCNC: 1204 U/L (ref 15–37)
AST SERPL-CCNC: 1941 U/L (ref 15–37)
AST SERPL-CCNC: ABNORMAL U/L (ref 15–37)
ATRIAL RATE: 110 BPM
B PARAPERT DNA SPEC QL NAA+PROBE: NOT DETECTED
B PERT DNA SPEC QL NAA+PROBE: NOT DETECTED
BASE EXCESS BLDA CALC-SCNC: -12.2 MMOL/L (ref ?–2)
BASE EXCESS BLDA CALC-SCNC: -12.8 MMOL/L (ref ?–2)
BASE EXCESS BLDA CALC-SCNC: -16.1 MMOL/L (ref ?–2)
BASE EXCESS BLDA CALC-SCNC: -16.5 MMOL/L (ref ?–2)
BASE EXCESS BLDA CALC-SCNC: -19.3 MMOL/L (ref ?–2)
BASE EXCESS BLDA CALC-SCNC: -23.1 MMOL/L (ref ?–2)
BASE EXCESS BLDA CALC-SCNC: 9.6 MMOL/L (ref ?–2)
BASOPHILS # BLD AUTO: 0.03 X10(3) UL (ref 0–0.2)
BASOPHILS # BLD AUTO: 0.04 X10(3) UL (ref 0–0.2)
BASOPHILS # BLD: 0 X10(3) UL (ref 0–0.2)
BASOPHILS NFR BLD AUTO: 0.2 %
BASOPHILS NFR BLD AUTO: 0.2 %
BASOPHILS NFR BLD: 0 %
BILIRUB SERPL-MCNC: 2.7 MG/DL (ref 0.1–2)
BILIRUB SERPL-MCNC: 3.6 MG/DL (ref 0.1–2)
BILIRUB SERPL-MCNC: 4 MG/DL (ref 0.1–2)
BILIRUB UR QL STRIP.AUTO: NEGATIVE
BLOOD TYPE BARCODE: 6200
BODY TEMPERATURE: 95.8 F
BODY TEMPERATURE: 97.3 F
BODY TEMPERATURE: 98.6 F
BUN BLD-MCNC: 31 MG/DL (ref 7–18)
BUN BLD-MCNC: 38 MG/DL (ref 7–18)
BUN BLD-MCNC: 50 MG/DL (ref 7–18)
BUN BLD-MCNC: 55 MG/DL (ref 7–18)
BUN BLD-MCNC: 57 MG/DL (ref 7–18)
C PNEUM DNA SPEC QL NAA+PROBE: NOT DETECTED
CA-I BLD-SCNC: 0.7 MMOL/L (ref 0.95–1.32)
CA-I BLD-SCNC: 0.78 MMOL/L (ref 0.95–1.32)
CA-I BLD-SCNC: 0.82 MMOL/L (ref 0.95–1.32)
CA-I BLD-SCNC: 0.89 MMOL/L (ref 0.95–1.32)
CA-I BLD-SCNC: 0.96 MMOL/L (ref 0.95–1.32)
CA-I BLD-SCNC: 0.99 MMOL/L (ref 0.95–1.32)
CA-I BLD-SCNC: 1.08 MMOL/L (ref 0.95–1.32)
CALCIUM BLD-MCNC: 6.6 MG/DL (ref 8.5–10.1)
CALCIUM BLD-MCNC: 7 MG/DL (ref 8.5–10.1)
CALCIUM BLD-MCNC: 7.7 MG/DL (ref 8.5–10.1)
CALCIUM BLD-MCNC: 8.3 MG/DL (ref 8.5–10.1)
CALCIUM BLD-MCNC: 8.7 MG/DL (ref 8.5–10.1)
CHLORIDE SERPL-SCNC: 100 MMOL/L (ref 98–112)
CHLORIDE SERPL-SCNC: 104 MMOL/L (ref 98–112)
CHLORIDE SERPL-SCNC: 105 MMOL/L (ref 98–112)
CO2 SERPL-SCNC: 13 MMOL/L (ref 21–32)
CO2 SERPL-SCNC: 15 MMOL/L (ref 21–32)
CO2 SERPL-SCNC: 5 MMOL/L (ref 21–32)
CO2 SERPL-SCNC: 8 MMOL/L (ref 21–32)
CO2 SERPL-SCNC: 9 MMOL/L (ref 21–32)
COHGB MFR BLD: 1.6 % SAT (ref 0–3)
COHGB MFR BLD: 1.7 % SAT (ref 0–3)
COHGB MFR BLD: 1.7 % SAT (ref 0–3)
COHGB MFR BLD: 2 % SAT (ref 0–3)
COLOR UR AUTO: YELLOW
CORONAVIRUS 229E PCR:: NOT DETECTED
CORONAVIRUS HKU1 PCR:: NOT DETECTED
CORONAVIRUS NL63 PCR:: NOT DETECTED
CORONAVIRUS OC43 PCR:: NOT DETECTED
CREAT BLD-MCNC: 3.06 MG/DL
CREAT BLD-MCNC: 3.09 MG/DL
CREAT BLD-MCNC: 3.24 MG/DL
CREAT BLD-MCNC: 3.36 MG/DL
CREAT BLD-MCNC: 3.68 MG/DL
D DIMER PPP FEU-MCNC: 6.49 UG/ML FEU (ref ?–0.76)
D DIMER PPP FEU-MCNC: >20 UG/ML FEU (ref ?–0.76)
EOSINOPHIL # BLD AUTO: 0 X10(3) UL (ref 0–0.7)
EOSINOPHIL # BLD AUTO: 0 X10(3) UL (ref 0–0.7)
EOSINOPHIL # BLD: 0 X10(3) UL (ref 0–0.7)
EOSINOPHIL NFR BLD AUTO: 0 %
EOSINOPHIL NFR BLD AUTO: 0 %
EOSINOPHIL NFR BLD: 0 %
ERYTHROCYTE [DISTWIDTH] IN BLOOD BY AUTOMATED COUNT: 14.3 %
ERYTHROCYTE [DISTWIDTH] IN BLOOD BY AUTOMATED COUNT: 14.5 %
ERYTHROCYTE [DISTWIDTH] IN BLOOD BY AUTOMATED COUNT: 15 %
EXPIRATORY PRESSURE: 6 CM H2O
FIBRINOGEN PPP-MCNC: 141 MG/DL (ref 180–480)
FIO2: 100 %
FIO2: 40 %
FIO2: 50 %
FIO2: 50 %
FLUAV RNA SPEC QL NAA+PROBE: NOT DETECTED
FLUBV RNA SPEC QL NAA+PROBE: NOT DETECTED
GLOBULIN PLAS-MCNC: 1.5 G/DL (ref 2.8–4.4)
GLOBULIN PLAS-MCNC: 2.9 G/DL (ref 2.8–4.4)
GLOBULIN PLAS-MCNC: 3 G/DL (ref 2.8–4.4)
GLUCOSE BLD-MCNC: 100 MG/DL (ref 70–99)
GLUCOSE BLD-MCNC: 100 MG/DL (ref 70–99)
GLUCOSE BLD-MCNC: 104 MG/DL (ref 70–99)
GLUCOSE BLD-MCNC: 105 MG/DL (ref 70–99)
GLUCOSE BLD-MCNC: 106 MG/DL (ref 70–99)
GLUCOSE BLD-MCNC: 113 MG/DL (ref 70–99)
GLUCOSE BLD-MCNC: 120 MG/DL (ref 70–99)
GLUCOSE BLD-MCNC: 126 MG/DL (ref 70–99)
GLUCOSE BLD-MCNC: 129 MG/DL (ref 70–99)
GLUCOSE BLD-MCNC: 136 MG/DL (ref 70–99)
GLUCOSE BLD-MCNC: 137 MG/DL (ref 70–99)
GLUCOSE BLD-MCNC: 152 MG/DL (ref 70–99)
GLUCOSE BLD-MCNC: 153 MG/DL (ref 70–99)
GLUCOSE BLD-MCNC: 157 MG/DL (ref 70–99)
GLUCOSE BLD-MCNC: 191 MG/DL (ref 70–99)
GLUCOSE BLD-MCNC: 65 MG/DL (ref 70–99)
GLUCOSE BLD-MCNC: 75 MG/DL (ref 70–99)
GLUCOSE BLD-MCNC: 76 MG/DL (ref 70–99)
GLUCOSE BLD-MCNC: 86 MG/DL (ref 70–99)
GLUCOSE BLD-MCNC: 89 MG/DL (ref 70–99)
GLUCOSE UR STRIP.AUTO-MCNC: NEGATIVE MG/DL
HCO3 BLDA-SCNC: 12 MEQ/L (ref 21–27)
HCO3 BLDA-SCNC: 12.4 MEQ/L (ref 21–27)
HCO3 BLDA-SCNC: 15 MEQ/L (ref 21–27)
HCO3 BLDA-SCNC: 15.4 MEQ/L (ref 21–27)
HCO3 BLDA-SCNC: 32.4 MEQ/L (ref 21–27)
HCO3 BLDA-SCNC: 6.9 MEQ/L (ref 21–27)
HCO3 BLDA-SCNC: 9.8 MEQ/L (ref 21–27)
HCT VFR BLD AUTO: 29.9 %
HCT VFR BLD AUTO: 46.1 %
HCT VFR BLD AUTO: 47 %
HGB BLD-MCNC: 11 G/DL
HGB BLD-MCNC: 13.2 G/DL
HGB BLD-MCNC: 13.3 G/DL
HGB BLD-MCNC: 13.6 G/DL
HGB BLD-MCNC: 13.6 G/DL
HGB BLD-MCNC: 14.2 G/DL
HGB BLD-MCNC: 14.3 G/DL
HGB BLD-MCNC: 14.7 G/DL
HGB BLD-MCNC: 15.2 G/DL
HGB BLD-MCNC: 8.2 G/DL
HYALINE CASTS #/AREA URNS AUTO: PRESENT /LPF
IMM GRANULOCYTES # BLD AUTO: 0.21 X10(3) UL (ref 0–1)
IMM GRANULOCYTES # BLD AUTO: 0.49 X10(3) UL (ref 0–1)
IMM GRANULOCYTES NFR BLD: 1.4 %
IMM GRANULOCYTES NFR BLD: 2.4 %
INR BLD: 2.1 (ref 0.8–1.2)
INR BLD: 2.79 (ref 0.8–1.2)
INR BLD: 3.61 (ref 0.8–1.2)
INR BLD: 5.3 (ref 0.8–1.2)
INSP PRESSURE: 14 CM H2O
INSP PRESSURE: 14 CM H2O
IPAP MAX: 6 CM H2O
KETONES UR STRIP.AUTO-MCNC: NEGATIVE MG/DL
L/M: 15 L/MIN
LACTATE BLD-SCNC: 10.5 MMOL/L (ref 0.5–2)
LACTATE BLD-SCNC: 14.1 MMOL/L (ref 0.5–2)
LACTATE BLD-SCNC: 5.4 MMOL/L (ref 0.5–2)
LACTATE BLD-SCNC: 5.5 MMOL/L (ref 0.5–2)
LACTATE BLD-SCNC: 6.4 MMOL/L (ref 0.5–2)
LACTATE BLD-SCNC: 8 MMOL/L (ref 0.5–2)
LACTATE BLD-SCNC: >16.5 MMOL/L (ref 0.5–2)
LACTATE SERPL-SCNC: 2.1 MMOL/L (ref 0.4–2)
LACTATE SERPL-SCNC: 4.4 MMOL/L (ref 0.4–2)
LACTATE SERPL-SCNC: 6.6 MMOL/L (ref 0.4–2)
LACTATE SERPL-SCNC: 6.9 MMOL/L (ref 0.4–2)
LACTATE SERPL-SCNC: 8.3 MMOL/L (ref 0.4–2)
LACTATE SERPL-SCNC: 9.4 MMOL/L (ref 0.4–2)
LEUKOCYTE ESTERASE UR QL STRIP.AUTO: NEGATIVE
LIPASE SERPL-CCNC: 654 U/L (ref 73–393)
LYMPHOCYTES # BLD AUTO: 0.43 X10(3) UL (ref 1–4)
LYMPHOCYTES # BLD AUTO: 1.01 X10(3) UL (ref 1–4)
LYMPHOCYTES NFR BLD AUTO: 2.1 %
LYMPHOCYTES NFR BLD AUTO: 6.9 %
LYMPHOCYTES NFR BLD: 1.62 X10(3) UL (ref 1–4)
LYMPHOCYTES NFR BLD: 8 %
MAGNESIUM SERPL-MCNC: 2.4 MG/DL (ref 1.6–2.6)
MAGNESIUM SERPL-MCNC: 2.6 MG/DL (ref 1.6–2.6)
MCH RBC QN AUTO: 32.2 PG (ref 26–34)
MCH RBC QN AUTO: 32.6 PG (ref 26–34)
MCH RBC QN AUTO: 33.6 PG (ref 26–34)
MCHC RBC AUTO-ENTMCNC: 27.4 G/DL (ref 31–37)
MCHC RBC AUTO-ENTMCNC: 29.5 G/DL (ref 31–37)
MCHC RBC AUTO-ENTMCNC: 31.3 G/DL (ref 31–37)
MCV RBC AUTO: 104.2 FL
MCV RBC AUTO: 109 FL
MCV RBC AUTO: 122.5 FL
METAPNEUMOVIRUS PCR:: NOT DETECTED
METHGB MFR BLD: 0.6 % SAT (ref 0.4–1.5)
METHGB MFR BLD: 0.7 % SAT (ref 0.4–1.5)
METHGB MFR BLD: 0.8 % SAT (ref 0.4–1.5)
METHGB MFR BLD: 0.8 % SAT (ref 0.4–1.5)
METHGB MFR BLD: 0.9 % SAT (ref 0.4–1.5)
METHGB MFR BLD: 1.2 % SAT (ref 0.4–1.5)
METHGB MFR BLD: 1.6 % SAT (ref 0.4–1.5)
MONOCYTES # BLD AUTO: 1.25 X10(3) UL (ref 0.1–1)
MONOCYTES # BLD AUTO: 1.47 X10(3) UL (ref 0.1–1)
MONOCYTES # BLD: 1.01 X10(3) UL (ref 0.1–1)
MONOCYTES NFR BLD AUTO: 7.2 %
MONOCYTES NFR BLD AUTO: 8.6 %
MONOCYTES NFR BLD: 5 %
MYCOPLASMA PNEUMONIA PCR:: NOT DETECTED
NEUTROPHILS # BLD AUTO: 12.04 X10 (3) UL (ref 1.5–7.7)
NEUTROPHILS # BLD AUTO: 12.04 X10(3) UL (ref 1.5–7.7)
NEUTROPHILS # BLD AUTO: 15.48 X10 (3) UL (ref 1.5–7.7)
NEUTROPHILS # BLD AUTO: 18 X10 (3) UL (ref 1.5–7.7)
NEUTROPHILS # BLD AUTO: 18 X10(3) UL (ref 1.5–7.7)
NEUTROPHILS NFR BLD AUTO: 82.9 %
NEUTROPHILS NFR BLD AUTO: 88.1 %
NEUTROPHILS NFR BLD: 76 %
NEUTS BAND NFR BLD: 11 %
NEUTS HYPERSEG # BLD: 17.57 X10(3) UL (ref 1.5–7.7)
NITRITE UR QL STRIP.AUTO: NEGATIVE
NRBC BLD MANUAL-RTO: 11 %
NT-PROBNP SERPL-MCNC: 8262 PG/ML (ref ?–450)
OSMOLALITY SERPL CALC.SUM OF ELEC: 287 MOSM/KG (ref 275–295)
OSMOLALITY SERPL CALC.SUM OF ELEC: 289 MOSM/KG (ref 275–295)
OSMOLALITY SERPL CALC.SUM OF ELEC: 289 MOSM/KG (ref 275–295)
OSMOLALITY SERPL CALC.SUM OF ELEC: 297 MOSM/KG (ref 275–295)
OSMOLALITY SERPL CALC.SUM OF ELEC: 299 MOSM/KG (ref 275–295)
OXYHGB MFR BLDA: 96 % (ref 92–100)
OXYHGB MFR BLDA: 96.1 % (ref 92–100)
OXYHGB MFR BLDA: 96.8 % (ref 92–100)
OXYHGB MFR BLDA: 96.9 % (ref 92–100)
OXYHGB MFR BLDA: 97.1 % (ref 92–100)
OXYHGB MFR BLDA: 97.4 % (ref 92–100)
OXYHGB MFR BLDA: 97.7 % (ref 92–100)
P/F RATIO: 1006 MMHG
P/F RATIO: 338 MMHG
P/F RATIO: 340 MMHG
P/F RATIO: 426 MMHG
PARAINFLUENZA 1 PCR:: NOT DETECTED
PARAINFLUENZA 2 PCR:: NOT DETECTED
PARAINFLUENZA 3 PCR:: NOT DETECTED
PARAINFLUENZA 4 PCR:: NOT DETECTED
PCO2 BLDA: 17 MM HG (ref 35–45)
PCO2 BLDA: 18 MM HG (ref 35–45)
PCO2 BLDA: 18 MM HG (ref 35–45)
PCO2 BLDA: 21 MM HG (ref 35–45)
PCO2 BLDA: 21 MM HG (ref 35–45)
PCO2 BLDA: 25 MM HG (ref 35–45)
PCO2 BLDA: 35 MM HG (ref 35–45)
PEEP: 5 CM H2O
PH BLDA: 7.07 [PH] (ref 7.35–7.45)
PH BLDA: 7.16 [PH] (ref 7.35–7.45)
PH BLDA: 7.27 [PH] (ref 7.35–7.45)
PH BLDA: 7.28 [PH] (ref 7.35–7.45)
PH BLDA: 7.29 [PH] (ref 7.35–7.45)
PH BLDA: 7.34 [PH] (ref 7.35–7.45)
PH BLDA: 7.57 [PH] (ref 7.35–7.45)
PH UR STRIP.AUTO: 5 [PH] (ref 5–8)
PHOSPHATE SERPL-MCNC: 5.7 MG/DL (ref 2.5–4.9)
PHOSPHATE SERPL-MCNC: 9.8 MG/DL (ref 2.5–4.9)
PLATELET # BLD AUTO: 143 10(3)UL (ref 150–450)
PLATELET # BLD AUTO: 15 10(3)UL (ref 150–450)
PLATELET # BLD AUTO: 153 10(3)UL (ref 150–450)
PLATELET MORPHOLOGY: NORMAL
PO2 BLDA: 135 MM HG (ref 80–100)
PO2 BLDA: 136 MM HG (ref 80–100)
PO2 BLDA: 141 MM HG (ref 80–100)
PO2 BLDA: 163 MM HG (ref 80–100)
PO2 BLDA: 205 MM HG (ref 80–100)
PO2 BLDA: 498 MM HG (ref 80–100)
PO2 BLDA: 518 MM HG (ref 80–100)
POTASSIUM BLD-SCNC: 4.6 MMOL/L (ref 3.6–5.1)
POTASSIUM BLD-SCNC: 4.8 MMOL/L (ref 3.6–5.1)
POTASSIUM BLD-SCNC: 5.2 MMOL/L (ref 3.6–5.1)
POTASSIUM BLD-SCNC: 5.9 MMOL/L (ref 3.6–5.1)
POTASSIUM BLD-SCNC: 5.9 MMOL/L (ref 3.6–5.1)
POTASSIUM BLD-SCNC: 6.5 MMOL/L (ref 3.6–5.1)
POTASSIUM BLD-SCNC: 6.6 MMOL/L (ref 3.6–5.1)
POTASSIUM SERPL-SCNC: 5 MMOL/L (ref 3.5–5.1)
POTASSIUM SERPL-SCNC: 5.4 MMOL/L (ref 3.5–5.1)
POTASSIUM SERPL-SCNC: 5.8 MMOL/L (ref 3.5–5.1)
POTASSIUM SERPL-SCNC: 5.8 MMOL/L (ref 3.5–5.1)
POTASSIUM SERPL-SCNC: 6.5 MMOL/L (ref 3.5–5.1)
PROCALCITONIN SERPL-MCNC: 0.06 NG/ML (ref ?–0.16)
PROT SERPL-MCNC: 3.2 G/DL (ref 6.4–8.2)
PROT SERPL-MCNC: 6.3 G/DL (ref 6.4–8.2)
PROT SERPL-MCNC: 6.4 G/DL (ref 6.4–8.2)
PROT UR STRIP.AUTO-MCNC: 100 MG/DL
PROTHROMBIN TIME: 23.6 SECONDS (ref 11.6–14.8)
PROTHROMBIN TIME: 29.6 SECONDS (ref 11.6–14.8)
PROTHROMBIN TIME: 36.3 SECONDS (ref 11.6–14.8)
PROTHROMBIN TIME: 49 SECONDS (ref 11.6–14.8)
Q-T INTERVAL: 372 MS
QRS DURATION: 108 MS
QTC CALCULATION (BEZET): 498 MS
R AXIS: -71 DEGREES
RBC # BLD AUTO: 2.44 X10(6)UL
RBC # BLD AUTO: 4.23 X10(6)UL
RBC # BLD AUTO: 4.51 X10(6)UL
RBC UR QL AUTO: NEGATIVE
RH BLOOD TYPE: POSITIVE
RHINOVIRUS/ENTERO PCR:: NOT DETECTED
RSV RNA SPEC QL NAA+PROBE: NOT DETECTED
SARS-COV-2 RNA NPH QL NAA+NON-PROBE: NOT DETECTED
SARS-COV-2 RNA RESP QL NAA+PROBE: NOT DETECTED
SODIUM BLD-SCNC: 125 MMOL/L (ref 135–145)
SODIUM BLD-SCNC: 125 MMOL/L (ref 135–145)
SODIUM BLD-SCNC: 126 MMOL/L (ref 135–145)
SODIUM BLD-SCNC: 126 MMOL/L (ref 135–145)
SODIUM BLD-SCNC: 129 MMOL/L (ref 135–145)
SODIUM BLD-SCNC: 133 MMOL/L (ref 135–145)
SODIUM BLD-SCNC: 146 MMOL/L (ref 135–145)
SODIUM SERPL-SCNC: 131 MMOL/L (ref 136–145)
SODIUM SERPL-SCNC: 131 MMOL/L (ref 136–145)
SODIUM SERPL-SCNC: 132 MMOL/L (ref 136–145)
SODIUM SERPL-SCNC: 139 MMOL/L (ref 136–145)
SODIUM SERPL-SCNC: 141 MMOL/L (ref 136–145)
SP GR UR STRIP.AUTO: 1.02 (ref 1–1.03)
T AXIS: 2 DEGREES
TIDAL VOLUME: 550 ML
TOTAL CELLS COUNTED BLD: 100
TRIGL SERPL-MCNC: 88 MG/DL (ref 30–149)
TROPONIN I HIGH SENSITIVITY: 45 NG/L
UROBILINOGEN UR STRIP.AUTO-MCNC: 2 MG/DL
VENT RATE: 24 /MIN
VENTRICULAR RATE: 108 BPM
WBC # BLD AUTO: 14.5 X10(3) UL (ref 4–11)
WBC # BLD AUTO: 20.2 X10(3) UL (ref 4–11)
WBC # BLD AUTO: 20.4 X10(3) UL (ref 4–11)

## 2022-01-01 PROCEDURE — 5A09357 ASSISTANCE WITH RESPIRATORY VENTILATION, LESS THAN 24 CONSECUTIVE HOURS, CONTINUOUS POSITIVE AIRWAY PRESSURE: ICD-10-PCS | Performed by: INTERNAL MEDICINE

## 2022-01-01 PROCEDURE — 93306 TTE W/DOPPLER COMPLETE: CPT | Performed by: NURSE PRACTITIONER

## 2022-01-01 PROCEDURE — 76376 3D RENDER W/INTRP POSTPROCES: CPT | Performed by: EMERGENCY MEDICINE

## 2022-01-01 PROCEDURE — 03HY32Z INSERTION OF MONITORING DEVICE INTO UPPER ARTERY, PERCUTANEOUS APPROACH: ICD-10-PCS | Performed by: INTERNAL MEDICINE

## 2022-01-01 PROCEDURE — 71045 X-RAY EXAM CHEST 1 VIEW: CPT | Performed by: INTERNAL MEDICINE

## 2022-01-01 PROCEDURE — 5A1945Z RESPIRATORY VENTILATION, 24-96 CONSECUTIVE HOURS: ICD-10-PCS | Performed by: INTERNAL MEDICINE

## 2022-01-01 PROCEDURE — 71045 X-RAY EXAM CHEST 1 VIEW: CPT | Performed by: NURSE PRACTITIONER

## 2022-01-01 PROCEDURE — 93970 EXTREMITY STUDY: CPT | Performed by: EMERGENCY MEDICINE

## 2022-01-01 PROCEDURE — 0BH18EZ INSERTION OF ENDOTRACHEAL AIRWAY INTO TRACHEA, VIA NATURAL OR ARTIFICIAL OPENING ENDOSCOPIC: ICD-10-PCS | Performed by: INTERNAL MEDICINE

## 2022-01-01 PROCEDURE — B548ZZA ULTRASONOGRAPHY OF SUPERIOR VENA CAVA, GUIDANCE: ICD-10-PCS | Performed by: RADIOLOGY

## 2022-01-01 PROCEDURE — 5A1D90Z PERFORMANCE OF URINARY FILTRATION, CONTINUOUS, GREATER THAN 18 HOURS PER DAY: ICD-10-PCS | Performed by: INTERNAL MEDICINE

## 2022-01-01 PROCEDURE — XW033H4 INTRODUCTION OF SYNTHETIC HUMAN ANGIOTENSIN II INTO PERIPHERAL VEIN, PERCUTANEOUS APPROACH, NEW TECHNOLOGY GROUP 4: ICD-10-PCS | Performed by: INTERNAL MEDICINE

## 2022-01-01 PROCEDURE — 0F9430Z DRAINAGE OF GALLBLADDER WITH DRAINAGE DEVICE, PERCUTANEOUS APPROACH: ICD-10-PCS | Performed by: RADIOLOGY

## 2022-01-01 PROCEDURE — 02HV33Z INSERTION OF INFUSION DEVICE INTO SUPERIOR VENA CAVA, PERCUTANEOUS APPROACH: ICD-10-PCS | Performed by: RADIOLOGY

## 2022-01-01 PROCEDURE — 76700 US EXAM ABDOM COMPLETE: CPT | Performed by: EMERGENCY MEDICINE

## 2022-01-01 PROCEDURE — 74181 MRI ABDOMEN W/O CONTRAST: CPT | Performed by: EMERGENCY MEDICINE

## 2022-01-01 PROCEDURE — 36620 INSERTION CATHETER ARTERY: CPT | Performed by: NURSE PRACTITIONER

## 2022-01-01 PROCEDURE — 71045 X-RAY EXAM CHEST 1 VIEW: CPT | Performed by: EMERGENCY MEDICINE

## 2022-01-01 PROCEDURE — 30233K1 TRANSFUSION OF NONAUTOLOGOUS FROZEN PLASMA INTO PERIPHERAL VEIN, PERCUTANEOUS APPROACH: ICD-10-PCS | Performed by: INTERNAL MEDICINE

## 2022-01-01 PROCEDURE — 74176 CT ABD & PELVIS W/O CONTRAST: CPT | Performed by: EMERGENCY MEDICINE

## 2022-01-01 RX ORDER — SODIUM CHLORIDE 9 MG/ML
125 INJECTION, SOLUTION INTRAVENOUS CONTINUOUS
Status: DISCONTINUED | OUTPATIENT
Start: 2022-01-01 | End: 2022-01-01

## 2022-01-01 RX ORDER — ACETAMINOPHEN 650 MG/1
650 SUPPOSITORY RECTAL EVERY 4 HOURS PRN
Status: DISCONTINUED | OUTPATIENT
Start: 2022-01-01 | End: 2022-01-01

## 2022-01-01 RX ORDER — KETOROLAC TROMETHAMINE 15 MG/ML
15 INJECTION, SOLUTION INTRAMUSCULAR; INTRAVENOUS EVERY 6 HOURS PRN
Status: DISCONTINUED | OUTPATIENT
Start: 2022-01-01 | End: 2022-01-01

## 2022-01-01 RX ORDER — LORAZEPAM 2 MG/ML
INJECTION INTRAMUSCULAR
Status: COMPLETED
Start: 2022-01-01 | End: 2022-01-01

## 2022-01-01 RX ORDER — BISACODYL 10 MG
10 SUPPOSITORY, RECTAL RECTAL
Status: DISCONTINUED | OUTPATIENT
Start: 2022-01-01 | End: 2022-01-01

## 2022-01-01 RX ORDER — LIDOCAINE HYDROCHLORIDE 10 MG/ML
INJECTION, SOLUTION INFILTRATION; PERINEURAL
Status: COMPLETED
Start: 2022-01-01 | End: 2022-01-01

## 2022-01-01 RX ORDER — POLYETHYLENE GLYCOL 3350 17 G/17G
17 POWDER, FOR SOLUTION ORAL DAILY PRN
Status: DISCONTINUED | OUTPATIENT
Start: 2022-01-01 | End: 2022-01-01

## 2022-01-01 RX ORDER — LORAZEPAM 2 MG/ML
0.5 INJECTION INTRAMUSCULAR ONCE
Status: COMPLETED | OUTPATIENT
Start: 2022-01-01 | End: 2022-01-01

## 2022-01-01 RX ORDER — ACETAMINOPHEN 325 MG/1
650 TABLET ORAL EVERY 6 HOURS PRN
Status: DISCONTINUED | OUTPATIENT
Start: 2022-01-01 | End: 2022-01-01

## 2022-01-01 RX ORDER — FAMOTIDINE 10 MG/ML
20 INJECTION, SOLUTION INTRAVENOUS DAILY
Status: DISCONTINUED | OUTPATIENT
Start: 2022-01-01 | End: 2022-01-01

## 2022-01-01 RX ORDER — DEXTROSE MONOHYDRATE 25 G/50ML
50 INJECTION, SOLUTION INTRAVENOUS AS NEEDED
Status: DISCONTINUED | OUTPATIENT
Start: 2022-01-01 | End: 2022-01-01

## 2022-01-01 RX ORDER — SENNOSIDES 8.8 MG/5ML
10 LIQUID ORAL NIGHTLY PRN
Status: DISCONTINUED | OUTPATIENT
Start: 2022-01-01 | End: 2022-01-01

## 2022-01-01 RX ORDER — METOCLOPRAMIDE HYDROCHLORIDE 5 MG/ML
5 INJECTION INTRAMUSCULAR; INTRAVENOUS EVERY 8 HOURS PRN
Status: DISCONTINUED | OUTPATIENT
Start: 2022-01-01 | End: 2022-01-01

## 2022-01-01 RX ORDER — SENNOSIDES 8.6 MG
17.2 TABLET ORAL NIGHTLY PRN
Status: DISCONTINUED | OUTPATIENT
Start: 2022-01-01 | End: 2022-01-01

## 2022-01-01 RX ORDER — MINERAL OIL AND PETROLATUM 150; 830 MG/G; MG/G
OINTMENT OPHTHALMIC 3 TIMES DAILY
Status: DISCONTINUED | OUTPATIENT
Start: 2022-01-01 | End: 2022-01-01

## 2022-01-01 RX ORDER — ETOMIDATE 2 MG/ML
INJECTION INTRAVENOUS
Status: COMPLETED
Start: 2022-01-01 | End: 2022-01-01

## 2022-01-01 RX ORDER — MELATONIN
3 NIGHTLY PRN
Status: DISCONTINUED | OUTPATIENT
Start: 2022-01-01 | End: 2022-01-01

## 2022-01-01 RX ORDER — CALCIUM GLUCONATE 20 MG/ML
2 INJECTION, SOLUTION INTRAVENOUS ONCE
Status: COMPLETED | OUTPATIENT
Start: 2022-01-01 | End: 2022-01-01

## 2022-01-01 RX ORDER — ETOMIDATE 2 MG/ML
0.3 INJECTION INTRAVENOUS ONCE
Status: COMPLETED | OUTPATIENT
Start: 2022-01-01 | End: 2022-01-01

## 2022-01-01 RX ORDER — HEPARIN SODIUM 5000 [USP'U]/ML
INJECTION, SOLUTION INTRAVENOUS; SUBCUTANEOUS
Status: COMPLETED
Start: 2022-01-01 | End: 2022-01-01

## 2022-01-01 RX ORDER — DEXMEDETOMIDINE HYDROCHLORIDE 4 UG/ML
INJECTION, SOLUTION INTRAVENOUS CONTINUOUS
Status: DISCONTINUED | OUTPATIENT
Start: 2022-01-01 | End: 2022-01-01

## 2022-01-01 RX ORDER — ROCURONIUM BROMIDE 10 MG/ML
80 INJECTION, SOLUTION INTRAVENOUS ONCE
Status: COMPLETED | OUTPATIENT
Start: 2022-01-01 | End: 2022-01-01

## 2022-01-01 RX ORDER — DEXMEDETOMIDINE HYDROCHLORIDE 4 UG/ML
INJECTION, SOLUTION INTRAVENOUS
Status: COMPLETED
Start: 2022-01-01 | End: 2022-01-01

## 2022-01-01 RX ORDER — CHLORHEXIDINE GLUCONATE 0.12 MG/ML
15 RINSE ORAL
Status: DISCONTINUED | OUTPATIENT
Start: 2022-01-01 | End: 2022-01-01

## 2022-01-01 RX ORDER — SODIUM CHLORIDE 9 MG/ML
INJECTION, SOLUTION INTRAVENOUS CONTINUOUS
Status: DISCONTINUED | OUTPATIENT
Start: 2022-01-01 | End: 2022-01-01

## 2022-01-01 RX ORDER — ACETAMINOPHEN 160 MG/5ML
650 SOLUTION ORAL EVERY 4 HOURS PRN
Status: DISCONTINUED | OUTPATIENT
Start: 2022-01-01 | End: 2022-01-01

## 2022-01-01 RX ORDER — ACETAMINOPHEN 10 MG/ML
1000 INJECTION, SOLUTION INTRAVENOUS EVERY 6 HOURS PRN
Status: DISCONTINUED | OUTPATIENT
Start: 2022-01-01 | End: 2022-01-01

## 2022-01-01 RX ORDER — DEXTROSE MONOHYDRATE 25 G/50ML
INJECTION, SOLUTION INTRAVENOUS
Status: COMPLETED
Start: 2022-01-01 | End: 2022-01-01

## 2022-01-01 RX ORDER — SODIUM BICARBONATE 150 MEQ/1,000 ML IN DEXTROSE 5 % INTRAVENOUS
100 SOLUTION CONTINUOUS
Status: DISCONTINUED | OUTPATIENT
Start: 2022-01-01 | End: 2022-01-01

## 2022-01-01 RX ORDER — PHENYLEPHRINE HCL IN 0.9% NACL 50MG/250ML
PLASTIC BAG, INJECTION (ML) INTRAVENOUS CONTINUOUS
Status: DISCONTINUED | OUTPATIENT
Start: 2022-01-01 | End: 2022-01-01

## 2022-01-01 RX ORDER — ACETAMINOPHEN 325 MG/1
650 TABLET ORAL EVERY 4 HOURS PRN
Status: DISCONTINUED | OUTPATIENT
Start: 2022-01-01 | End: 2022-01-01

## 2022-01-01 RX ORDER — LIDOCAINE HYDROCHLORIDE 10 MG/ML
5 INJECTION, SOLUTION EPIDURAL; INFILTRATION; INTRACAUDAL; PERINEURAL
Status: ACTIVE | OUTPATIENT
Start: 2022-01-01 | End: 2022-01-01

## 2022-01-01 RX ORDER — SODIUM CHLORIDE 9 MG/ML
INJECTION, SOLUTION INTRAVENOUS ONCE
Status: COMPLETED | OUTPATIENT
Start: 2022-01-01 | End: 2022-01-01

## 2022-01-01 RX ORDER — ONDANSETRON 2 MG/ML
4 INJECTION INTRAMUSCULAR; INTRAVENOUS EVERY 6 HOURS PRN
Status: DISCONTINUED | OUTPATIENT
Start: 2022-01-01 | End: 2022-01-01

## 2022-01-01 RX ORDER — HEPARIN SODIUM 1000 [USP'U]/ML
1.5 INJECTION, SOLUTION INTRAVENOUS; SUBCUTANEOUS AS NEEDED
Status: DISCONTINUED | OUTPATIENT
Start: 2022-01-01 | End: 2022-01-01

## 2022-05-08 ENCOUNTER — PATIENT MESSAGE (OUTPATIENT)
Dept: ORTHOPEDICS CLINIC | Facility: CLINIC | Age: 77
End: 2022-05-08

## 2022-05-09 ENCOUNTER — TELEPHONE (OUTPATIENT)
Dept: ORTHOPEDICS CLINIC | Facility: CLINIC | Age: 77
End: 2022-05-09

## 2022-05-09 NOTE — TELEPHONE ENCOUNTER
Patient is scheduled with Dr. Gilford Ables for right knee pain. Please advise if imaging is needed.

## 2022-05-09 NOTE — TELEPHONE ENCOUNTER
From: Eusebio Bernstein  To: Kate Jean MD  Sent: 5/8/2022 12:39 AM CDT  Subject: My knee    Who should I see?

## 2022-05-16 ENCOUNTER — TELEPHONE (OUTPATIENT)
Dept: ORTHOPEDICS CLINIC | Facility: CLINIC | Age: 77
End: 2022-05-16

## 2022-05-16 NOTE — TELEPHONE ENCOUNTER
Patient states that he had Xrays done on 5/12/22 at [facility]. Will Dr. Coiln Garcia be able to see those?

## 2022-05-17 ENCOUNTER — HOSPITAL ENCOUNTER (OUTPATIENT)
Dept: CV DIAGNOSTICS | Facility: HOSPITAL | Age: 77
Discharge: HOME | End: 2022-05-17
Attending: INTERNAL MEDICINE
Payer: MEDICARE

## 2022-05-17 DIAGNOSIS — Z01.89 ENCOUNTER FOR LOWER EXTREMITY COMPARISON IMAGING STUDY: Primary | ICD-10-CM

## 2022-05-17 DIAGNOSIS — I05.1 RHEUMATIC MITRAL REGURGITATION: ICD-10-CM

## 2022-05-17 PROCEDURE — 93306 TTE W/DOPPLER COMPLETE: CPT | Performed by: INTERNAL MEDICINE

## 2022-05-18 ENCOUNTER — HOSPITAL ENCOUNTER (OUTPATIENT)
Dept: GENERAL RADIOLOGY | Age: 77
Discharge: HOME | End: 2022-05-18
Attending: ORTHOPAEDIC SURGERY
Payer: MEDICARE

## 2022-05-18 ENCOUNTER — OFFICE VISIT (OUTPATIENT)
Dept: ORTHOPEDICS CLINIC | Facility: CLINIC | Age: 77
End: 2022-05-18
Payer: MEDICARE

## 2022-05-18 DIAGNOSIS — M17.11 PRIMARY OSTEOARTHRITIS OF RIGHT KNEE: Primary | ICD-10-CM

## 2022-05-18 DIAGNOSIS — M25.561 RIGHT KNEE PAIN, UNSPECIFIED CHRONICITY: ICD-10-CM

## 2022-05-18 DIAGNOSIS — Z01.89 ENCOUNTER FOR LOWER EXTREMITY COMPARISON IMAGING STUDY: ICD-10-CM

## 2022-05-18 PROCEDURE — 73560 X-RAY EXAM OF KNEE 1 OR 2: CPT | Performed by: ORTHOPAEDIC SURGERY

## 2022-05-18 PROCEDURE — 73564 X-RAY EXAM KNEE 4 OR MORE: CPT | Performed by: ORTHOPAEDIC SURGERY

## 2022-05-18 PROCEDURE — 99203 OFFICE O/P NEW LOW 30 MIN: CPT | Performed by: ORTHOPAEDIC SURGERY

## 2022-05-23 PROBLEM — I95.9 HYPOTENSION: Status: ACTIVE | Noted: 2022-05-23

## 2022-05-23 PROBLEM — N28.9 RENAL INSUFFICIENCY: Status: ACTIVE | Noted: 2022-01-01

## 2022-05-23 PROBLEM — I48.91 ATRIAL FIBRILLATION WITH RAPID VENTRICULAR RESPONSE (HCC): Status: ACTIVE | Noted: 2022-01-01

## 2022-05-23 PROBLEM — I95.9 HYPOTENSION, UNSPECIFIED HYPOTENSION TYPE: Status: ACTIVE | Noted: 2022-01-01

## 2022-05-23 PROBLEM — R79.89 ELEVATED D-DIMER: Status: ACTIVE | Noted: 2022-05-23

## 2022-05-23 PROBLEM — N28.9 RENAL INSUFFICIENCY: Status: ACTIVE | Noted: 2022-05-23

## 2022-05-23 PROBLEM — E87.3 RESPIRATORY ALKALOSIS: Status: ACTIVE | Noted: 2022-05-23

## 2022-05-23 PROBLEM — I48.91 ATRIAL FIBRILLATION WITH RAPID VENTRICULAR RESPONSE (HCC): Status: ACTIVE | Noted: 2022-05-23

## 2022-05-23 PROBLEM — I95.9 HYPOTENSION: Status: ACTIVE | Noted: 2022-01-01

## 2022-05-23 PROBLEM — I95.9 HYPOTENSION, UNSPECIFIED HYPOTENSION TYPE: Status: ACTIVE | Noted: 2022-05-23

## 2022-05-23 PROBLEM — R79.89 ELEVATED D-DIMER: Status: ACTIVE | Noted: 2022-01-01

## 2022-05-23 PROBLEM — E87.2 METABOLIC ACIDOSIS: Status: ACTIVE | Noted: 2022-05-23

## 2022-05-23 PROBLEM — I50.9 ACUTE ON CHRONIC CONGESTIVE HEART FAILURE, UNSPECIFIED HEART FAILURE TYPE (HCC): Status: ACTIVE | Noted: 2022-05-23

## 2022-05-23 PROBLEM — R79.89 ELEVATED LFTS: Status: ACTIVE | Noted: 2022-01-01

## 2022-05-23 PROBLEM — E87.2 METABOLIC ACIDOSIS: Status: ACTIVE | Noted: 2022-01-01

## 2022-05-23 PROBLEM — R79.89 ELEVATED LFTS: Status: ACTIVE | Noted: 2022-05-23

## 2022-05-23 PROBLEM — E87.3 RESPIRATORY ALKALOSIS: Status: ACTIVE | Noted: 2022-01-01

## 2022-05-23 PROBLEM — R33.9 URINARY RETENTION: Status: ACTIVE | Noted: 2022-05-23

## 2022-05-23 PROBLEM — I50.9 ACUTE ON CHRONIC CONGESTIVE HEART FAILURE, UNSPECIFIED HEART FAILURE TYPE (HCC): Status: ACTIVE | Noted: 2022-01-01

## 2022-05-23 PROBLEM — R33.9 URINARY RETENTION: Status: ACTIVE | Noted: 2022-01-01

## 2022-05-23 NOTE — ED QUICK NOTES
Patient unable to provide medications or medical hx at this time. Is answering some questions but easily agitated.

## 2022-05-23 NOTE — ED INITIAL ASSESSMENT (HPI)
To the ED from EMS- patient called EMS for VITALIY and inablity to get himself up from his basement due to weakness. BP 60s per EMS. O2 Sat in 50s on EMS arrival. Patient is awake, talking, breathing non labored. Pale- cloths soiled.

## 2022-05-24 PROBLEM — E87.2 LACTIC ACIDOSIS: Status: ACTIVE | Noted: 2022-01-01

## 2022-05-24 NOTE — PROCEDURES
[address] [facility] Patient Status:  Inpatient    1945 MRN [id number]   [facility] 4SW-A Attending Lashae Marinelli MD   [facility] Day # 1 PCP Mundo Mcrae MD     Intubation    Indications:  Respiratory failure    Procedure Details:  Intubation Method:  Video-assisted  Patient Status:  Paralyzed (RSI) and Sedated  Preoxygenation:  bipap      Sedation:      Sedation:  Etomidate  Paralytic:  Rocuronium  Laryngoscope Size: Mac 4  Tube Size (mm):  7.5  Tube Type:  Cuffed  Number of Attempts:  2  Ventilation between attempts:  BVM  Cricoid Pressure:  yes  Cords Visualized:  yes  Post-Procedure Assessment:  Chest Rise and ETCO2 Monitor  Breath Sounds:  Equal  Cuff inflated? yes  ETT to lip (cm): 25  Tube secured with:  ETT lemus    CXR ordered    Patient tolerance:  Patient tolerated the procedure well with no immediate complications.

## 2022-05-24 NOTE — PROCEDURES
[facility]  Procedure Note    Trish Beasley Patient Status:  Inpatient    1945 MRN [id number]   [facility] 4SW-A Attending Kenroy Morejon MD   Hosp Day # 1 PCP Maria L Reyes MD     Procedure: Perc edgar tube placement, bedside    Pre-Procedure Diagnosis:  Cholecystitis    Post-Procedure Diagnosis: Same    Anesthesia:  Local    Findings:  See final report. Bedside procedure due to clinical status of patient. 6f APD placed to [address] [place] [address]. 20 ml bilious obtained and sent for testing.     Specimens: As above    Blood Loss:  Minimal    Tourniquet Time: none  Complications:  None  Drains:  As above    Secondary Diagnosis:  none    Yehuda Christianson MD  2022

## 2022-05-24 NOTE — CM/SW NOTE
Care Progression Note:  Active Acute Medical Issue:   Hypotension, unspecified hypotension type, Acute hypoxic resp failure, Septic shock,     Other Contributing Medical Factors/Dx:  CAD, mitral regurg s/p MitraClip, moderate aortic stenosis, pulm HTN, chronic diastolic HF, Hodgkin disease, possible acute cholecystitis, SALMA, Coagulopathy, tourette's syndrome  Length of stay: 1  GMLOS: not noted  Avoidable Delays: none  Discharge Barriers: Awaiting clinical improvement. On Cardizem gtt for a-fib, Levophed and Vasopressin for hypotension. On Bipap 100% FIO2. Confused. Noted w/ progressive hyperkalemia; CRRT initiated. NPO. Full Code- Multisystem organ failure present. Vitamin K ordered to facilitate cholecystostomy drain per Cardiology. placement. Expected discharge date: TBD  Expected discharge level of care: TBD- Will need PT/OT evaluation when medically appropriate. Prognosis guarded. CM spoke with Shon Bobby (Power of  & brother) who lives in [place]. Per brother, patient lives alone in a 2 story, unkept townhouse that will need professional cleaning prior to discharge from hospital. Brother/POA will pay for the cleaning. Patient has a wife that lives at [address] d/t advanced Parkinson's disease. If patient is to need a NAI, MBBonnie is preferred. NO children noted. No other support person noted in [place]. POA is scheduled to have knee replacement surgery on 5/25, thus will be unreachable that day. Per POA, patient lives in a 2 story home where he spends most of the time in the basement. Patient was feeling ILL approximately 3 weeks prior to hospital admission. Prior to 3 weeks ago, patient was driving and independent with ADL's. Per POA patient would want everything done to stay alive being his goal is to be able to care for his wife at [facility].    Per Hanny Primer- patient would not want a feeding tube &/or to live in a nursing home LTC in a vegetative [facility].     CM/SW to follow and assist with care needs.      Fabiano Jacome, RN, BSN   [phone]

## 2022-05-24 NOTE — PROGRESS NOTES
Patient becoming increasingly agitated on BIPAP and was attempting to repeatedly pull the mask off, be aggressive towards staff and use inappropriate language. Wrist restraints were applied to discourage removal but patient continuing to remove and be agitated. Was given ativan in ED--additional dose ordered and was given by RN. Given ABG without hypoxia or hypercarbia, will attempt breaks from BIPAP and monitor tolerance. HFNC applied by RT--patient did tolerate this for MR imaging this evening. Discussed with RN and RT that BIPAP should be reapplied for any respiratory distress or increased work of breathing. Precedex gtt will likely be needed if reapplied. NEVILLE Mojica  Critical Care NP  [facility] [phone]: [phone]  Pgr: [phone]    Patient with continuous restlessness, increased pressor needs. ABG checked and BIPAP reapplied. ABG done on 15L HFNC--worsening metabolic acidosis noted and lactic acidosis. Patient is very ill appearing, warm body, clammy and cool extremities. One amp Bicarb gtt given. [facility] and Nephrology updated on concerns regarding worsening acidosis with scant UOP. CRRT and line placement discussed with Dr Cale Morris (Nephrology) and on-call IR (Dr Ilana Ayala). Plan established. [facility] notified. HD line to be placed at approximately 0730 by Dr Ilana Ayala and Dr Cale Morris aware. Bicarb gtt started and one amp bicarb given in the interim.

## 2022-05-24 NOTE — CONSULTS
[facility] Pharmacy Note:  Renal Dose Adjustment (CRRT)    Hayes Enciso has been prescribed piperacillin/tazobactam (ZOSYN) 3.375 g every 12 hours. Pharmacy has been asked to review medications for appropriateness for CRRT. Renal Replacement fluid rate is  2500 mL/hr. Based on this replacement rate, the CrCl is estimated to be 42 mL/min. Pharmacy will adjust the medications to the following doses:  piperacillin/tazobactam (ZOSYN) 3.375 g every 8 hours. Pharmacy will also continue to assess the replacement rate and make any dose changes accordingly.     Thank you,  Sherif Jefferson, PharmD  5/24/2022 9:55 AM

## 2022-05-24 NOTE — CONSULTS
[facility] Pharmacy Note:  Initiation of Stress Ulcer Prophylaxis     Lobito Cordova is a 69year old patient and meets criteria for the initiation of stress ulcer prophylaxis based on the presence of: A Coagulopathy. famotidine (PEPCID) has been initiated per pharmacy protocol.     Thank you,  Jeanne Ortiz, PharmD  5/24/2022 9:57 AM

## 2022-05-24 NOTE — PLAN OF CARE
Patient received this morning on continuous BiPAP, agitated/restless. Levophed and Vasopressin infusing for BP support. Noted to be in Afib RVR. Patient able to follow commands, pupils equal and reactive. Cyanosis noted to nailbeds. Mottling noted to BUE/BLE. Precedex gtt started for agitation. IR at bedside around 0730 for HD cath placement. CRRT initiated with goal fluid removal of net zero. CRRT filter clotted ~3.5 hours after initiation. Blood returned. [facility] notified. Around 1200, patient noted to have an irregular breathing pattern and is unarousable. Precedex gtt stopped. Patient also noted to be increasingly cyanotic. Generalized cyanosis noted to face, chest, abdomen and extremities. Dr. Long Founds at bedside around [phone] to reassess patient. Patient noted to still be unarousable and have an irregular breathing pattern. Decision made to intubate. Dr. Long Founds intubated patient with Etomidate/Rocuronium. No sedated started post-intubation. Patient continues to be unresponsive. CRRT restarted. Around 1400, patient hypotensive with SBP in the 60s. Neosynephrine started and quickly titrated to its max dose in order to maintain SBP >90 and/or MAP >60. CRRT continuing to have clotting issues. Dr. Ethan Beard notified. Unable to keep patient net even d/t clotting issues and hypotension. Attempted to add pre-filter saline without success. IR at bedside at 1600 for Clarice-drain placement. Procedure completed without complication. 1710: Patient HR increasing. Attempted to start Giapreza and decrease Levophed in order to decrease HR. Patient became increasingly hypotensive and is now on max doses of Levo, Vaso, Elliott and Giapreza. UF set to zero d/t hypotension. Dr. Ulisses Brown notified. APN at bedside. 1 amp BiCarb given. ABG drawn. Epi gtt ordered and started. HR and BP improved after Bicarb. Dr. Marcos Hardy notified of patient condition. Monitoring closely for changes.

## 2022-05-24 NOTE — PLAN OF CARE
Received pt from ED around 2010, A&Ox4 on BIPAP with 0.9 and cardizem infusing. Denies pain. Follows commands. General surgery consulted per order. Taken down for MRI/MRCP, pt agitated during scan demanding to be taken out of MRI machine. Around 2300 pt hypotensive; levo gtt started per order. Pt becoming increasingly agitated throughout the night, ripping off BIPAP mask, attempting to get out of bed, being aggressive to staff and using inappropriate language; Marielle Mix notified, PRN ativan given per order without relief. B/l wrist restraints applied per order for pt safety. Precedex gtt started per order. Cardizem gtt stopped around 0200 after starting precedex gtt. C/o nausea; PRN zofran given per order. Pt noted to become increasingly hypotensive, levo gtt titrated per order. Precedex gtt stopped. ABG obtained. 1 amp bicarb given per order. 1 amp D50 given per order. Renal paged and updated, spoke with Dr. Sabino Basurto; orders obtained for CRRT. HD line to be placed at 0730. Vaso gtt started per order. Additional amp of bicarb given and bicarb gtt started per order. POA updated on plan of care. PICC line and A-line placed by CCAPN. Mobley intact, minimal urine output. Hypothermic at times; herlinda hugger on/off. Afib on monitor. All critical values reported to [facility]. See flowsheets. See MAR. Will continue to monitor.

## 2022-05-25 NOTE — PLAN OF CARE
Time of death: 7667 am.   Confirmed by RN's-Liliam & Helen Bynum. Luci Litten (brother) notified of death per phone conversation with APN. [facility] notified. All belongings sent with patient to the [facility].

## 2022-05-25 NOTE — PROCEDURES
Arterial Line  Performed by: NEVILLE Lewis     General Information and Staff     Procedure Start: 7906  Patient Location:  ICU  Indication: continuous blood pressure monitoring and blood sampling needed    Site Identification: real time ultrasound guided, sterile technique used     Procedure Details     Catheter Size:  20 G  Catheter Length:  1 and 3/4 inchCatheter Type:  Arrow  Seldinger Technique?: Yes    Laterality:  left  Site: radial    Site Prep: chlorhexidine  Line Secured:  Tape and Tegaderm     Assessment: Wilmar's test negative, Good flash, guidewire and catheter advanced without difficulty, pulsatile blood flow noted.     Events: patient tolerated procedure well with no complications       NEVILLE Lewis  Critical Care NP  [facility] [phone]: [phone]  Pgr: [phone]

## 2022-05-25 NOTE — PLAN OF CARE
Received pt minimally responsive on no sedation on ventilator with levo/vaso/bonifacio/giapreza/epi infusing on CRRT. Afib RVR noted. No withdraw to pain, non-purposeful movement noted at times x4 extremities. ABG obtained around 2030. Renal paged and updated with results and pt condition, spoke with Dr. Suraj Vasquez, therapy fluid increased per order. 1 amp bicarb given and bicarb gtt started per order from Lincoln Dorsey. 2200 epi gtt maxed. 1 amp D50 given for hypoglycemia. Around 2300 CRRT clotted. Around 0000 additional amp of bicarb given. Around 0130 ABG obtained. 2 amps bicarb and calcium gluconate given per order. Around 0300 pt increasingly hypotensive; CRRT stopped per order from [facility] and blood returned. Afib RVR continues, amio gtt started per order. Brother/POA, Debbie Ortiz, called and updated on pt's worsening condition and lack of response to current interventions. Debbie Ortiz expressed understanding and wants to continue full aggressive care. 2483 pt progressively bradycardic; atropine given per order. Amio stopped. 0810-9456 no perfusable pulse present, ACLS initiated. ROSC achieved. See code sheet documentation. Pt remains unresponsive on ventilator, maxed on all pressors. [facility] called, case #[id number]. All critical lab values reported to [facility]. Clarice drain intact with minimal brown/bilious output. OG intact, draining brown/bilious output. Mobley intact with no urine output. See flowsheets. See MAR. Will continue to monitor.

## 2022-05-25 NOTE — PROGRESS NOTES
[facility] Pharmacy Note:  Renal Adjustment for piperacillin/tazobactam (Ange Savanah)    Varun Lema is a 69year old patient who has been prescribed piperacillin/tazobactam (ZOSYN) 3.375 g every 8 hrs while on CRRT. CRRT has been stopped and his estimated creatinine clearance is 19.4 mL/min (A) (based on SCr of 3.24 mg/dL (H)). The dose has been adjusted to piperacillin/tazobactam (ZOSYN) 3.375 g every 12 hrs per hospital renal dose adjustment protocol. Pharmacy will follow and adjust dose as warranted for additional renal function changes.     Thank you,    Igor Soriano, PharmD  5/25/2022  9:00 AM

## 2022-05-25 NOTE — PROGRESS NOTES
Repeat ABG ordered given 1800 ABG drawn just after BICARB IVP given and patient continues to have increasing vasopressor needs. ABG results noted. Currently 7.16, 21, 136, 9.8 with be -19.3 and LA 14.1 despite CRRT running. Additional bicarb needed. [facility] on-call, Dr Murtaza Sandhu IV, updated and agreed with plan. RN updating Nephrology on ABG--they gave okay for bicarb gtt. Bicarb gtt with 3amps in D5 @ 100ml/h ordered with additional 1 amp IV bicarb now. CRRT adjustments per renal.    NEVILLE Ward  Critical Care NP  [facility] [phone]: [phone]  Pgr: [phone]    CRRT pause for approximately 2hrs due to clotting--currently being reconnected. Follow up ABG completed given prior acidosis and current CRRT issue. Acidosis continues to worsen. Additional amps of Bicarb ordered and gtt continues. iCal also low and Ca gluconate ordered to replace. High vasopressor support continues--max ordered doses of Levo, Elliott, Vaso, Epi and Giapreza. RN attempting to contact cardiology for possible rate control medication (Amio) as BP noted to decrease further as heart rate increases. Unable to give Diltiazem or Metoprolol given high vasopressor needs. Patient's brother Albert(POA) was updated on continued decline and lack of response to treatments--as per patient wishes, his brother is requesting Maycol Morales remain full code with all aggressive measures. Luly Zarate does acknowledge that Maycol Morales will ultimately not survive his present status despite current level of treatment. [id number]  Patient with progressive bradycardia and worsening hypotension. Atropine given without response. Heart rate continued to drop into 30 range, pulse fleeting and artline waveform with minimal conduction and reading MAP 20-30. CODE BLUE/ACLS efforts initiated as per patient/brother (POA) wishes. One amp Bicarb and 1 Epi given with return of pulse by doppler and arterial waveform. See code sheet for further.

## (undated) DIAGNOSIS — M25.561 RIGHT KNEE PAIN, UNSPECIFIED CHRONICITY: Primary | ICD-10-CM

## (undated) DIAGNOSIS — Z01.89 ENCOUNTER FOR LOWER EXTREMITY COMPARISON IMAGING STUDY: ICD-10-CM

## (undated) DEVICE — Device

## (undated) DEVICE — ENDOSCOPY PACK UPPER: Brand: MEDLINE INDUSTRIES, INC.

## (undated) DEVICE — 1200CC GUARDIAN II: Brand: GUARDIAN

## (undated) DEVICE — Device: Brand: DEFENDO AIR/WATER/SUCTION AND BIOPSY VALVE

## (undated) DEVICE — LAWSON - GDWR AMPLATZ SS 035X1X260 ST

## (undated) DEVICE — FILTERLINE NASAL ADULT O2/CO2

## (undated) DEVICE — 3M™ RED DOT™ MONITORING ELECTRODE WITH FOAM TAPE AND STICKY GEL, 50/BAG, 20/CASE, 72/PLT 2570: Brand: RED DOT™

## (undated) NOTE — LETTER
Milla Guerrero [address] [facility] [address], [address] [facility]  Authorization for Procedure   Date:__5/24/2022_________                                                                                            Time:__________  1. I hereby authorize Placement of Trialysis catheter , my physician and his/her assistants (if applicable), which may include medical students, residents, and/or fellows, to perform the following surgical operation/ procedure and administer such anesthesia as may be determined necessary by my physician: Dr. Santa Wynn name (s)   on [phone]   2. I recognize that during the surgical operation/procedure, unforeseen conditions may necessitate additional or different procedures than those listed above. I, therefore, further authorize and request that the above-named surgeon, assistants, or designees perform such procedures as are, in their judgment, necessary and desirable. 3.   My surgeon/physician has discussed prior to my surgery the potential benefits, risks and side effects of this procedure; the likelihood of achieving goals; and potential problems that might occur during recuperation. They also discussed reasonable alternatives to the procedure, including risks, benefits, and side effects related to the alternatives and risks related to not receiving this procedure. I have had all my questions answered and I acknowledge that no guarantee has been made as to the result that may be obtained. 4.   Should the need arise during my operation or immediate post-operative period, I also consent to the administration of blood and/or blood products. Further, I understand that despite careful testing and screening of blood or blood products by collecting agencies, I may still be subject to ill effects as a result of receiving a blood transfusion and/or blood products.   The following are some, but not all, of the potential risks that can occur: fever and allergic reactions, hemolytic reactions, transmission of diseases such as Hepatitis, AIDS and Cytomegalovirus (CMV) and fluid overload. In the event that I wish to have an autologous transfusion of my own blood, or a directed donor transfusion. I will discuss this with my physician. 5.   I authorize the use of any specimen, organs, tissues, body parts or foreign objects that may be removed from my body during the operation/procedure for diagnosis, research or teaching purposes and their subsequent disposal by hospital authorities. I also authorize the release of specimen test results and/or written reports to my treating physician on the hospital medical staff or other referring or consulting physicians involved in my care, at the discretion of the Pathologist or my treating physician. 6.   I consent to the photographing or videotaping of the operations or procedures to be performed, including appropriate portions of my body for medical, scientific, or educational purposes, provided my identity is not revealed by the pictures or by descriptive texts accompanying them. If the procedure has been photographed/videotaped, the surgeon will obtain the original picture, image, videotape or CD. The hospital will not be responsible for storage, release or maintenance of the picture, image, tape or CD.    7.   I consent to the presence of a  or observers in the operating room as deemed necessary by my physician or their designees. 8.   I recognize that in the event my procedure results in extended X-Ray/fluoroscopy time, I may develop a skin reaction. 9. If I have a Do Not Attempt Resuscitation (DNAR) order in place, that status will be suspended while in the operating room, procedural suite, and during the recovery period unless otherwise explicitly stated by me (or a person authorized to consent on my behalf).  The surgeon or my attending physician will determine when the applicable recovery period ends for purposes of reinstating the DNAR order. 10. Patients having a sterilization procedure: I understand that if the procedure is successful the results will be permanent and it will therefore be impossible for me to inseminate, conceive, or bear children. I also understand that the procedure is intended to result in sterility, although the result has not been guaranteed. 11. I acknowledge that my physician has explained sedation/analgesia administration to me including the risk and benefits I consent to the administration of sedation/analgesia as may be necessary or desirable in the judgment of my physician.     I CERTIFY THAT I HAVE READ AND FULLY UNDERSTAND THE ABOVE CONSENT TO OPERATION and/or OTHER PROCEDURE.      _________________________________________  __________________________________  Signature of Patient     Signature of Responsible Person         ___________________________________         Printed Name of Responsible Person           _________________________________                 Relationship to Patient  _________________________________________  ______________________________  Signature of Witness          Date  Time          Patient Name: Evelina Antoine     : 1945                 Printed: May 24, 2022     Medical Record #: [id number]                                            Page 1 of 1

## (undated) NOTE — LETTER
4/3/2019  [facility]    Dear Fifi Nelson,     Here are the results from your recent testing :    Imaging Orders  Esoph[employer]    Results and Recommendations     As conveyed by Dr. Iwona Kong, there is a

## (undated) NOTE — LETTER
Milla Guerrero [address]  [address], [address] [facility]    Consent for Operation  Date: __________________                                Time: _______________    1.  I authorize the performance upon Luly Cervantes the following operation:  Procedur revealed by the pictures or by descriptive texts accompanying them. If the procedure has been videotaped, the surgeon will obtain the original videotape. The hospital will not be responsible for storage or maintenance of this tape.   7. For the purpose of a THAT MY DOCTOR PROVIDED ME WITH THE ABOVE EXPLANATIONS, THAT ALL BLANKS OR STATEMENTS REQUIRING INSERTION OR COMPLETION WERE FILLED IN.     Signature of Patient:   ___________________________    When the patient is a minor or mentally incompetent to give co iii. All of the medicines I take (including prescriptions, herbal supplements, and pills I can buy without a prescription (including street drugs/illegal medications).  Failure to inform my anesthesiologist about these medicines may increase my risk of anes _____________________________________________________________________________  Anesthesiologist Signature     Date   Time  I have discussed the procedure and information above with the patient (or patient’s representative) and answered their questions.  The